# Patient Record
Sex: FEMALE | Race: WHITE | Employment: OTHER | URBAN - METROPOLITAN AREA
[De-identification: names, ages, dates, MRNs, and addresses within clinical notes are randomized per-mention and may not be internally consistent; named-entity substitution may affect disease eponyms.]

---

## 2019-10-23 PROBLEM — G47.00 INSOMNIA: Status: ACTIVE | Noted: 2018-04-18

## 2019-10-23 PROBLEM — Z79.4 TYPE 2 DIABETES MELLITUS WITHOUT COMPLICATION, WITH LONG-TERM CURRENT USE OF INSULIN (HCC): Status: ACTIVE | Noted: 2018-04-18

## 2019-10-23 PROBLEM — E87.6 HYPOKALEMIA: Status: ACTIVE | Noted: 2019-10-23

## 2019-10-23 PROBLEM — D50.9 IRON DEFICIENCY ANEMIA: Status: ACTIVE | Noted: 2019-10-23

## 2019-10-23 PROBLEM — M54.50 LUMBAR PAIN: Status: ACTIVE | Noted: 2019-07-24

## 2019-10-23 PROBLEM — E78.5 HYPERLIPIDEMIA: Status: ACTIVE | Noted: 2019-10-23

## 2019-10-23 PROBLEM — E11.9 TYPE 2 DIABETES MELLITUS WITHOUT COMPLICATION, WITH LONG-TERM CURRENT USE OF INSULIN (HCC): Status: ACTIVE | Noted: 2018-04-18

## 2020-08-06 ENCOUNTER — HOSPITAL ENCOUNTER (OUTPATIENT)
Age: 80
Discharge: HOME | DRG: 390 | End: 2020-08-06
Payer: MEDICARE

## 2020-08-06 ENCOUNTER — APPOINTMENT (OUTPATIENT)
Dept: GENERAL RADIOLOGY | Age: 80
DRG: 390 | End: 2020-08-06
Payer: MEDICARE

## 2020-08-06 ENCOUNTER — HOSPITAL ENCOUNTER (INPATIENT)
Age: 80
LOS: 2 days | Discharge: HOME | DRG: 390 | End: 2020-08-08
Attending: EMERGENCY MEDICINE | Admitting: INTERNAL MEDICINE
Payer: MEDICARE

## 2020-08-06 ENCOUNTER — APPOINTMENT (OUTPATIENT)
Dept: CT IMAGING | Age: 80
DRG: 390 | End: 2020-08-06
Payer: MEDICARE

## 2020-08-06 ENCOUNTER — HOSPITAL ENCOUNTER (OUTPATIENT)
Dept: GENERAL RADIOLOGY | Age: 80
Discharge: HOME | DRG: 390 | End: 2020-08-06
Payer: MEDICARE

## 2020-08-06 PROBLEM — K56.7 ILEUS (HCC): Status: ACTIVE | Noted: 2020-08-06

## 2020-08-06 LAB
A/G RATIO: 1.4 (ref 1.1–2.2)
ALBUMIN SERPL-MCNC: 4.2 G/DL (ref 3.4–5)
ALP BLD-CCNC: 83 U/L (ref 40–129)
ALT SERPL-CCNC: 25 U/L (ref 10–40)
ANION GAP SERPL CALCULATED.3IONS-SCNC: 12 MMOL/L (ref 3–16)
APTT: 28.4 SEC (ref 24.2–36.2)
AST SERPL-CCNC: 40 U/L (ref 15–37)
BASOPHILS ABSOLUTE: 0 K/UL (ref 0–0.2)
BASOPHILS RELATIVE PERCENT: 0.2 %
BILIRUB SERPL-MCNC: 0.4 MG/DL (ref 0–1)
BUN BLDV-MCNC: 19 MG/DL (ref 7–20)
CALCIUM SERPL-MCNC: 10 MG/DL (ref 8.3–10.6)
CHLORIDE BLD-SCNC: 105 MMOL/L (ref 99–110)
CO2: 25 MMOL/L (ref 21–32)
CREAT SERPL-MCNC: 1.5 MG/DL (ref 0.6–1.2)
EOSINOPHILS ABSOLUTE: 0 K/UL (ref 0–0.6)
EOSINOPHILS RELATIVE PERCENT: 0 %
GFR AFRICAN AMERICAN: 41
GFR NON-AFRICAN AMERICAN: 34
GLOBULIN: 3 G/DL
GLUCOSE BLD-MCNC: 124 MG/DL (ref 70–99)
GLUCOSE BLD-MCNC: 144 MG/DL (ref 70–99)
HCT VFR BLD CALC: 35.3 % (ref 36–48)
HEMOGLOBIN: 12 G/DL (ref 12–16)
INR BLD: 0.98 (ref 0.86–1.14)
LIPASE: 16 U/L (ref 13–60)
LYMPHOCYTES ABSOLUTE: 0.4 K/UL (ref 1–5.1)
LYMPHOCYTES RELATIVE PERCENT: 6.7 %
MCH RBC QN AUTO: 30.8 PG (ref 26–34)
MCHC RBC AUTO-ENTMCNC: 34 G/DL (ref 31–36)
MCV RBC AUTO: 90.6 FL (ref 80–100)
MONOCYTES ABSOLUTE: 0.4 K/UL (ref 0–1.3)
MONOCYTES RELATIVE PERCENT: 6.2 %
NEUTROPHILS ABSOLUTE: 5.1 K/UL (ref 1.7–7.7)
NEUTROPHILS RELATIVE PERCENT: 86.9 %
PDW BLD-RTO: 14.4 % (ref 12.4–15.4)
PERFORMED ON: ABNORMAL
PLATELET # BLD: 109 K/UL (ref 135–450)
PMV BLD AUTO: 9 FL (ref 5–10.5)
POTASSIUM SERPL-SCNC: 3.7 MMOL/L (ref 3.5–5.1)
PROTHROMBIN TIME: 11.4 SEC (ref 10–13.2)
RBC # BLD: 3.9 M/UL (ref 4–5.2)
SODIUM BLD-SCNC: 142 MMOL/L (ref 136–145)
TOTAL PROTEIN: 7.2 G/DL (ref 6.4–8.2)
TROPONIN: <0.01 NG/ML
WBC # BLD: 5.9 K/UL (ref 4–11)

## 2020-08-06 PROCEDURE — 94761 N-INVAS EAR/PLS OXIMETRY MLT: CPT

## 2020-08-06 PROCEDURE — 85610 PROTHROMBIN TIME: CPT

## 2020-08-06 PROCEDURE — 74019 RADEX ABDOMEN 2 VIEWS: CPT

## 2020-08-06 PROCEDURE — 1200000000 HC SEMI PRIVATE

## 2020-08-06 PROCEDURE — 74018 RADEX ABDOMEN 1 VIEW: CPT

## 2020-08-06 PROCEDURE — 80053 COMPREHEN METABOLIC PANEL: CPT

## 2020-08-06 PROCEDURE — 85025 COMPLETE CBC W/AUTO DIFF WBC: CPT

## 2020-08-06 PROCEDURE — 85730 THROMBOPLASTIN TIME PARTIAL: CPT

## 2020-08-06 PROCEDURE — 84484 ASSAY OF TROPONIN QUANT: CPT

## 2020-08-06 PROCEDURE — 93005 ELECTROCARDIOGRAM TRACING: CPT | Performed by: EMERGENCY MEDICINE

## 2020-08-06 PROCEDURE — 0D9670Z DRAINAGE OF STOMACH WITH DRAINAGE DEVICE, VIA NATURAL OR ARTIFICIAL OPENING: ICD-10-PCS | Performed by: PHYSICIAN ASSISTANT

## 2020-08-06 PROCEDURE — 99285 EMERGENCY DEPT VISIT HI MDM: CPT

## 2020-08-06 PROCEDURE — 83690 ASSAY OF LIPASE: CPT

## 2020-08-06 PROCEDURE — 74176 CT ABD & PELVIS W/O CONTRAST: CPT

## 2020-08-06 PROCEDURE — 83036 HEMOGLOBIN GLYCOSYLATED A1C: CPT

## 2020-08-06 RX ORDER — SODIUM CHLORIDE 9 MG/ML
INJECTION, SOLUTION INTRAVENOUS CONTINUOUS
Status: DISCONTINUED | OUTPATIENT
Start: 2020-08-06 | End: 2020-08-06

## 2020-08-06 RX ORDER — ONDANSETRON 2 MG/ML
4 INJECTION INTRAMUSCULAR; INTRAVENOUS EVERY 6 HOURS PRN
Status: DISCONTINUED | OUTPATIENT
Start: 2020-08-06 | End: 2020-08-06 | Stop reason: SDUPTHER

## 2020-08-06 RX ORDER — DOXAZOSIN MESYLATE 1 MG/1
2 TABLET ORAL NIGHTLY
Status: DISCONTINUED | OUTPATIENT
Start: 2020-08-06 | End: 2020-08-08 | Stop reason: HOSPADM

## 2020-08-06 RX ORDER — ACETAMINOPHEN 650 MG/1
650 SUPPOSITORY RECTAL EVERY 6 HOURS PRN
Status: DISCONTINUED | OUTPATIENT
Start: 2020-08-06 | End: 2020-08-08 | Stop reason: HOSPADM

## 2020-08-06 RX ORDER — CETIRIZINE HYDROCHLORIDE 10 MG/1
10 TABLET ORAL DAILY
Status: DISCONTINUED | OUTPATIENT
Start: 2020-08-07 | End: 2020-08-08 | Stop reason: HOSPADM

## 2020-08-06 RX ORDER — POLYETHYLENE GLYCOL 3350 17 G/17G
17 POWDER, FOR SOLUTION ORAL DAILY PRN
Status: DISCONTINUED | OUTPATIENT
Start: 2020-08-06 | End: 2020-08-08 | Stop reason: HOSPADM

## 2020-08-06 RX ORDER — DEXTROSE MONOHYDRATE 25 G/50ML
12.5 INJECTION, SOLUTION INTRAVENOUS PRN
Status: DISCONTINUED | OUTPATIENT
Start: 2020-08-06 | End: 2020-08-08 | Stop reason: HOSPADM

## 2020-08-06 RX ORDER — CALCIUM CARBONATE/VITAMIN D3 250-3.125
1 TABLET ORAL 2 TIMES DAILY
Status: DISCONTINUED | OUTPATIENT
Start: 2020-08-06 | End: 2020-08-08 | Stop reason: HOSPADM

## 2020-08-06 RX ORDER — SODIUM CHLORIDE 0.9 % (FLUSH) 0.9 %
10 SYRINGE (ML) INJECTION EVERY 12 HOURS SCHEDULED
Status: DISCONTINUED | OUTPATIENT
Start: 2020-08-06 | End: 2020-08-08 | Stop reason: HOSPADM

## 2020-08-06 RX ORDER — INSULIN LISPRO 100 [IU]/ML
0.05 INJECTION, SOLUTION INTRAVENOUS; SUBCUTANEOUS
Status: DISCONTINUED | OUTPATIENT
Start: 2020-08-07 | End: 2020-08-08 | Stop reason: HOSPADM

## 2020-08-06 RX ORDER — HYDROXYCHLOROQUINE SULFATE 200 MG/1
200 TABLET, FILM COATED ORAL 2 TIMES DAILY
Status: DISCONTINUED | OUTPATIENT
Start: 2020-08-06 | End: 2020-08-08 | Stop reason: HOSPADM

## 2020-08-06 RX ORDER — ATORVASTATIN CALCIUM 80 MG/1
80 TABLET, FILM COATED ORAL NIGHTLY
Status: DISCONTINUED | OUTPATIENT
Start: 2020-08-06 | End: 2020-08-08 | Stop reason: HOSPADM

## 2020-08-06 RX ORDER — MORPHINE SULFATE 2 MG/ML
2 INJECTION, SOLUTION INTRAMUSCULAR; INTRAVENOUS EVERY 4 HOURS PRN
Status: DISCONTINUED | OUTPATIENT
Start: 2020-08-06 | End: 2020-08-06

## 2020-08-06 RX ORDER — INSULIN LISPRO 100 [IU]/ML
0-6 INJECTION, SOLUTION INTRAVENOUS; SUBCUTANEOUS EVERY 4 HOURS
Status: DISCONTINUED | OUTPATIENT
Start: 2020-08-06 | End: 2020-08-08

## 2020-08-06 RX ORDER — PROMETHAZINE HYDROCHLORIDE 25 MG/1
12.5 TABLET ORAL EVERY 6 HOURS PRN
Status: DISCONTINUED | OUTPATIENT
Start: 2020-08-06 | End: 2020-08-08 | Stop reason: HOSPADM

## 2020-08-06 RX ORDER — CYCLOBENZAPRINE HCL 10 MG
10 TABLET ORAL 3 TIMES DAILY PRN
Status: DISCONTINUED | OUTPATIENT
Start: 2020-08-06 | End: 2020-08-08 | Stop reason: HOSPADM

## 2020-08-06 RX ORDER — LEVOTHYROXINE SODIUM 0.1 MG/1
100 TABLET ORAL
Status: DISCONTINUED | OUTPATIENT
Start: 2020-08-07 | End: 2020-08-08 | Stop reason: HOSPADM

## 2020-08-06 RX ORDER — MORPHINE SULFATE 2 MG/ML
2 INJECTION, SOLUTION INTRAMUSCULAR; INTRAVENOUS
Status: DISCONTINUED | OUTPATIENT
Start: 2020-08-06 | End: 2020-08-08 | Stop reason: HOSPADM

## 2020-08-06 RX ORDER — DEXTROSE MONOHYDRATE 50 MG/ML
100 INJECTION, SOLUTION INTRAVENOUS PRN
Status: DISCONTINUED | OUTPATIENT
Start: 2020-08-06 | End: 2020-08-08 | Stop reason: HOSPADM

## 2020-08-06 RX ORDER — MORPHINE SULFATE 4 MG/ML
4 INJECTION, SOLUTION INTRAMUSCULAR; INTRAVENOUS
Status: DISCONTINUED | OUTPATIENT
Start: 2020-08-06 | End: 2020-08-08 | Stop reason: HOSPADM

## 2020-08-06 RX ORDER — SODIUM CHLORIDE 9 MG/ML
INJECTION, SOLUTION INTRAVENOUS CONTINUOUS
Status: DISCONTINUED | OUTPATIENT
Start: 2020-08-06 | End: 2020-08-08 | Stop reason: HOSPADM

## 2020-08-06 RX ORDER — ACETAMINOPHEN 325 MG/1
650 TABLET ORAL EVERY 6 HOURS PRN
Status: DISCONTINUED | OUTPATIENT
Start: 2020-08-06 | End: 2020-08-08 | Stop reason: HOSPADM

## 2020-08-06 RX ORDER — LEVOTHYROXINE SODIUM 0.1 MG/1
100 TABLET ORAL DAILY
Status: DISCONTINUED | OUTPATIENT
Start: 2020-08-07 | End: 2020-08-06 | Stop reason: CLARIF

## 2020-08-06 RX ORDER — ASPIRIN 81 MG/1
81 TABLET ORAL DAILY
Status: DISCONTINUED | OUTPATIENT
Start: 2020-08-07 | End: 2020-08-08 | Stop reason: HOSPADM

## 2020-08-06 RX ORDER — ONDANSETRON 2 MG/ML
4 INJECTION INTRAMUSCULAR; INTRAVENOUS EVERY 6 HOURS PRN
Status: DISCONTINUED | OUTPATIENT
Start: 2020-08-06 | End: 2020-08-08 | Stop reason: HOSPADM

## 2020-08-06 RX ORDER — NICOTINE POLACRILEX 4 MG
15 LOZENGE BUCCAL PRN
Status: DISCONTINUED | OUTPATIENT
Start: 2020-08-06 | End: 2020-08-08 | Stop reason: HOSPADM

## 2020-08-06 RX ORDER — SODIUM CHLORIDE 0.9 % (FLUSH) 0.9 %
10 SYRINGE (ML) INJECTION PRN
Status: DISCONTINUED | OUTPATIENT
Start: 2020-08-06 | End: 2020-08-08 | Stop reason: HOSPADM

## 2020-08-06 ASSESSMENT — ENCOUNTER SYMPTOMS
COUGH: 0
ABDOMINAL DISTENTION: 1
VOMITING: 0
WHEEZING: 0
ABDOMINAL PAIN: 1
RHINORRHEA: 0
SHORTNESS OF BREATH: 1
NAUSEA: 0
DIARRHEA: 0

## 2020-08-06 ASSESSMENT — PAIN SCALES - GENERAL: PAINLEVEL_OUTOF10: 5

## 2020-08-06 NOTE — ED PROVIDER NOTES
[address] [facility]        Pt Name: Mary Leary  MRN: [id number]  [place] 1940  Date of evaluation: 8/6/2020  Provider: Antony Martínez PA-C  PCP: Trinidad Gonzales MD     I have seen and evaluated this patient with my supervising physician No att. providers found. CHIEF COMPLAINT       Chief Complaint   Patient presents with    Abnormal Test Results     Had EGD and colonoscopy with polyps removed and now having increased shortness of breath. HISTORY OF PRESENT ILLNESS   (Location, Timing/Onset, Context/Setting, Quality, Duration, Modifying Factors, Severity, Associated Signs and Symptoms)  Note limiting factors. Mary Leary is a 67 y.o. female presents for evaluation of abdominal distention and shortness of breath status post colonoscopy and EGD performed earlier today. She did have polypectomy. Outpatient abdominal films showed severe distention and pneumatosis concerning for possible perforation versus obstruction. Sent to ED for further evaluation management. Nursing Notes were all reviewed and agreed with or any disagreements were addressed in the HPI. REVIEW OF SYSTEMS    (2-9 systems for level 4, 10 or more for level 5)     Review of Systems   Constitutional: Negative for appetite change, chills and fever. HENT: Negative for congestion and rhinorrhea. Respiratory: Positive for shortness of breath. Negative for cough and wheezing. Cardiovascular: Negative for chest pain. Gastrointestinal: Positive for abdominal distention and abdominal pain. Negative for diarrhea, nausea and vomiting. Genitourinary: Negative for difficulty urinating, dysuria and hematuria. Musculoskeletal: Negative for neck pain and neck stiffness. Skin: Negative for rash. Neurological: Negative for headaches. Positives and Pertinent negatives as per HPI.   Except as noted above in the ROS, all other systems were Voltaren  [diclofenac sodium]; and Elavil  [amitriptyline hcl]    FAMILYHISTORY       Family History   Problem Relation Age of Onset    Heart Disease Father           SOCIAL HISTORY       Social History     Tobacco Use    Smoking status: Never Smoker    Smokeless tobacco: Never Used   Substance Use Topics    Alcohol use: No    Drug use: No       SCREENINGS    [place] Coma Scale  Eye Opening: Spontaneous  Best Verbal Response: Oriented  Best Motor Response: Obeys commands  [place] Coma Scale Score: 15        PHYSICAL EXAM    (up to 7 for level 4, 8 or more for level 5)     ED Triage Vitals [08/06/20 1753]   BP Temp Temp src Pulse Resp SpO2 Height Weight   -- 98.1 °F (36.7 °C) -- 67 18 97 % 5' 2\" (1.575 m) 170 lb (77.1 kg)       Physical Exam  Vitals signs and nursing note reviewed. Constitutional:       General: She is not in acute distress. Appearance: She is well-developed. She is not ill-appearing, toxic-appearing or diaphoretic. HENT:      Head: Normocephalic and atraumatic. Right Ear: External ear normal.      Left Ear: External ear normal.      Nose: Nose normal.   Eyes:      General:         Right eye: No discharge. Left eye: No discharge. Neck:      Musculoskeletal: Normal range of motion and neck supple. Cardiovascular:      Rate and Rhythm: Normal rate and regular rhythm. Heart sounds: Normal heart sounds. Pulmonary:      Effort: Pulmonary effort is normal. No respiratory distress. Breath sounds: Normal breath sounds. Chest:      Chest wall: No tenderness. Abdominal:      General: There is distension. Palpations: Abdomen is soft. There is no mass. Tenderness: There is no abdominal tenderness. There is no guarding or rebound. Hernia: No hernia is present. Musculoskeletal: Normal range of motion. Skin:     General: Skin is warm and dry. Neurological:      Mental Status: She is alert and oriented to person, place, and time.    Psychiatric: Behavior: Behavior normal.         DIAGNOSTIC RESULTS   LABS:    Labs Reviewed   CBC WITH AUTO DIFFERENTIAL - Abnormal; Notable for the following components:       Result Value    RBC 3.90 (*)     Hematocrit 35.3 (*)     Platelets 012 (*)     Lymphocytes Absolute 0.4 (*)     All other components within normal limits    Narrative:     Performed at:  [facility] [address] [place], [place]   Phone [phone]   COMPREHENSIVE METABOLIC PANEL - Abnormal; Notable for the following components:    Glucose 144 (*)     CREATININE 1.5 (*)     GFR Non- 34 (*)     GFR  41 (*)     AST 40 (*)     All other components within normal limits    Narrative:     Performed at:  [facility] [address], [place]   Phone [phone]   LIPASE    Narrative:     Performed at:  [facility] [address] [facility] [facility] [place] [place]   Phone [phone]   TROPONIN    Narrative:     Performed at:  [facility] [address], [place]   Phone [phone]   APTT    Narrative:     Performed at:  [facility] [address], [place]   Phone [phone]   PROTIME-INR    Narrative:     Performed at:  [facility] [address], [place]   Phone [phone]   CBC WITH AUTO DIFFERENTIAL   COMPREHENSIVE METABOLIC PANEL   PROTIME-INR   LACTIC ACID, PLASMA   LIPASE   MAGNESIUM   PHOSPHORUS   PREALBUMIN   APTT   TRANSFERRIN   HEMOGLOBIN A1C   POCT GLUCOSE   POCT GLUCOSE   POCT GLUCOSE   POCT GLUCOSE       All other labs were within normal range or not returned as of this dictation. EKG: All EKG's are interpreted by the Emergency Department Physician in the absence of a cardiologist.  Please see their note for interpretation of EKG.       RADIOLOGY: Non-plain film images such as CT, Ultrasound and MRI are read by the radiologist. Plain radiographic images are visualized and preliminarily interpreted by the ED Provider with the below findings:        Interpretation per the Radiologist below, if available at the time of this note:    CT CHEST [address]   Final Result   No acute intrathoracic abnormality. Dilation of the stomach and small bowel, likely ileus. Xr Abdomen (2 Views)    Result Date: 8/6/2020  EXAMINATION: TWO XRAY VIEWS OF THE ABDOMEN 8/6/2020 4:46 pm COMPARISON: None available HISTORY: ORDERING SYSTEM PROVIDED HISTORY: Acute abdominal pain TECHNOLOGIST PROVIDED HISTORY: Reason for Exam: pain after colonoscopy today Acuity: Unknown Type of Exam: Unknown FINDINGS: Curvature of the lower thoracic and lumbar spine is identified with upper concavity to the left. The lung bases are not well evaluated; no lobar basilar consolidation is suspected. No free intraperitoneal air is identified. There is at least moderate dilation of the stomach; multiple central and left small-bowel loops are also dilated measuring up to 4.1 cm. Gas is scattered throughout the colon. Within the pelvis, bowel pneumatosis is suspected. Marked dilation of the stomach and moderate dilation of small bowel loops. Pneumatosis of bowel within the pelvis is suspected. Bowel dilation could represent ileus or obstruction.            PROCEDURES   Unless otherwise noted below, none     Procedures    CRITICAL CARE TIME   N/A    CONSULTS:  IP CONSULT TO HOSPITALIST  IP CONSULT TO GI  IP CONSULT TO HOSPITALIST      EMERGENCY DEPARTMENT COURSE and DIFFERENTIAL DIAGNOSIS/MDM:   Vitals:    Vitals:    08/06/20 1753 08/06/20 1833   BP:  (!) 137/57   Pulse: 67 77   Resp: 18 21   Temp: 98.1 °F (36.7 °C)    SpO2: 97% 98%   Weight: 170 lb (77.1 kg)    Height: 5' 2\" (1.575 m)        Patient was given the following medications:  Medications   0.9 % sodium chloride infusion (has no administration in time range)   morphine (PF) injection 2 mg (has no administration in time range)     Or   morphine injection 4 mg (has no administration in time range)   ondansetron (ZOFRAN) injection 4 mg (has no administration in time range)   sodium chloride flush 0.9 % injection 10 mL (has no administration in time range)   sodium chloride flush 0.9 % injection 10 mL (has no administration in time range)   acetaminophen (TYLENOL) tablet 650 mg (has no administration in time range)     Or   acetaminophen (TYLENOL) suppository 650 mg (has no administration in time range)   polyethylene glycol (GLYCOLAX) packet 17 g (has no administration in time range)   promethazine (PHENERGAN) tablet 12.5 mg (has no administration in time range)     Or   ondansetron (ZOFRAN) injection 4 mg (has no administration in time range)   enoxaparin (LOVENOX) injection 30 mg (has no administration in time range)   insulin lispro (1 Unit Dial) 4 Units (has no administration in time range)   glucose (GLUTOSE) 40 % oral gel 15 g (has no administration in time range)   dextrose 50 % IV solution (has no administration in time range)   glucagon (rDNA) injection 1 mg (has no administration in time range)   dextrose 5 % solution (has no administration in time range)   insulin lispro (1 Unit Dial) 0-6 Units (has no administration in time range)           Patient presents for evaluation of abdominal pain/distention and shortness of breath status post colonoscopy. On exam, she seems uncomfortable but is in no acute distress and nontoxic. Vitals are stable and she is afebrile, satting 97% on room air. Lungs are clear to auscultation bilaterally. Abdomen is distended but nontender. Not rigid. Please see attending note for EKG interpretation. CBC and CMP are unremarkable. She has a  mild chronic kidney disease with a creatinine of 1.5. Lipase 16. Troponin is negative. Coags are within normal limits.   CT of the chest abdomen pelvis shows no intrathoracic abnormalities. She does have dilation of the stomach and small bowel likely ileus. I spoke with the on-call general surgeon, Dr. Krissy Corral, as well as on-call gastroenterologist, Dr. Carmen Abarca, both agreed for NG tube placement, admission for observation and will consult with the patient in the morning. Hospitals will resume care the patient at this time. Patient was informed and is agreeable. She is stable for admission. FINAL IMPRESSION      1.  [facility])          DISPOSITION/PLAN   DISPOSITION        PATIENT REFERREDTO:  Trinidad Gonzales, [address] [address] [phone]            DISCHARGE MEDICATIONS:  New Prescriptions    No medications on file       DISCONTINUED MEDICATIONS:  Discontinued Medications    No medications on file              (Please note that portions of this note were completed with a voice recognition program.  Efforts were made to edit the dictations but occasionally words are mis-transcribed.)    Antony Martínez PA-C (electronically signed)           Roberto Tapia PA-C  08/06/20 5268

## 2020-08-07 ENCOUNTER — APPOINTMENT (OUTPATIENT)
Dept: GENERAL RADIOLOGY | Age: 80
DRG: 390 | End: 2020-08-07
Payer: MEDICARE

## 2020-08-07 PROBLEM — N18.30 CKD (CHRONIC KIDNEY DISEASE) STAGE 3, GFR 30-59 ML/MIN (HCC): Status: ACTIVE | Noted: 2020-08-07

## 2020-08-07 LAB
A/G RATIO: 1.7 (ref 1.1–2.2)
ALBUMIN SERPL-MCNC: 3.6 G/DL (ref 3.4–5)
ALP BLD-CCNC: 63 U/L (ref 40–129)
ALT SERPL-CCNC: 20 U/L (ref 10–40)
ANION GAP SERPL CALCULATED.3IONS-SCNC: 9 MMOL/L (ref 3–16)
APTT: 22.7 SEC (ref 24.2–36.2)
AST SERPL-CCNC: 30 U/L (ref 15–37)
BASOPHILS ABSOLUTE: 0 K/UL (ref 0–0.2)
BASOPHILS RELATIVE PERCENT: 0.8 %
BILIRUB SERPL-MCNC: 0.3 MG/DL (ref 0–1)
BUN BLDV-MCNC: 16 MG/DL (ref 7–20)
CALCIUM SERPL-MCNC: 9.3 MG/DL (ref 8.3–10.6)
CHLORIDE BLD-SCNC: 107 MMOL/L (ref 99–110)
CO2: 28 MMOL/L (ref 21–32)
CREAT SERPL-MCNC: 1.4 MG/DL (ref 0.6–1.2)
EKG ATRIAL RATE: 72 BPM
EKG DIAGNOSIS: NORMAL
EKG P AXIS: 55 DEGREES
EKG P-R INTERVAL: 172 MS
EKG Q-T INTERVAL: 438 MS
EKG QRS DURATION: 98 MS
EKG QTC CALCULATION (BAZETT): 479 MS
EKG R AXIS: -16 DEGREES
EKG T AXIS: -10 DEGREES
EKG VENTRICULAR RATE: 72 BPM
EOSINOPHILS ABSOLUTE: 0 K/UL (ref 0–0.6)
EOSINOPHILS RELATIVE PERCENT: 0 %
ESTIMATED AVERAGE GLUCOSE: 131.2 MG/DL
GFR AFRICAN AMERICAN: 44
GFR NON-AFRICAN AMERICAN: 36
GLOBULIN: 2.1 G/DL
GLUCOSE BLD-MCNC: 101 MG/DL (ref 70–99)
GLUCOSE BLD-MCNC: 118 MG/DL (ref 70–99)
GLUCOSE BLD-MCNC: 86 MG/DL (ref 70–99)
GLUCOSE BLD-MCNC: 89 MG/DL (ref 70–99)
GLUCOSE BLD-MCNC: 90 MG/DL (ref 70–99)
GLUCOSE BLD-MCNC: 96 MG/DL (ref 70–99)
HBA1C MFR BLD: 6.2 %
HCT VFR BLD CALC: 31.3 % (ref 36–48)
HEMOGLOBIN: 10.4 G/DL (ref 12–16)
INR BLD: 1.05 (ref 0.86–1.14)
LACTIC ACID: 0.7 MMOL/L (ref 0.4–2)
LIPASE: 11 U/L (ref 13–60)
LYMPHOCYTES ABSOLUTE: 1.1 K/UL (ref 1–5.1)
LYMPHOCYTES RELATIVE PERCENT: 22.6 %
MAGNESIUM: 2 MG/DL (ref 1.8–2.4)
MCH RBC QN AUTO: 30.1 PG (ref 26–34)
MCHC RBC AUTO-ENTMCNC: 33.3 G/DL (ref 31–36)
MCV RBC AUTO: 90.3 FL (ref 80–100)
MONOCYTES ABSOLUTE: 0.5 K/UL (ref 0–1.3)
MONOCYTES RELATIVE PERCENT: 10.8 %
NEUTROPHILS ABSOLUTE: 3.1 K/UL (ref 1.7–7.7)
NEUTROPHILS RELATIVE PERCENT: 65.8 %
PDW BLD-RTO: 14.1 % (ref 12.4–15.4)
PERFORMED ON: ABNORMAL
PERFORMED ON: ABNORMAL
PERFORMED ON: NORMAL
PHOSPHORUS: 3.2 MG/DL (ref 2.5–4.9)
PLATELET # BLD: 102 K/UL (ref 135–450)
PMV BLD AUTO: 9.1 FL (ref 5–10.5)
POTASSIUM SERPL-SCNC: 3.3 MMOL/L (ref 3.5–5.1)
PREALBUMIN: 17.4 MG/DL (ref 20–40)
PROTHROMBIN TIME: 12.2 SEC (ref 10–13.2)
RBC # BLD: 3.47 M/UL (ref 4–5.2)
SODIUM BLD-SCNC: 144 MMOL/L (ref 136–145)
TOTAL PROTEIN: 5.7 G/DL (ref 6.4–8.2)
TRANSFERRIN: 186 MG/DL (ref 200–360)
WBC # BLD: 4.8 K/UL (ref 4–11)

## 2020-08-07 PROCEDURE — 2580000003 HC RX 258: Performed by: INTERNAL MEDICINE

## 2020-08-07 PROCEDURE — 84100 ASSAY OF PHOSPHORUS: CPT

## 2020-08-07 PROCEDURE — 83690 ASSAY OF LIPASE: CPT

## 2020-08-07 PROCEDURE — 84134 ASSAY OF PREALBUMIN: CPT

## 2020-08-07 PROCEDURE — 80053 COMPREHEN METABOLIC PANEL: CPT

## 2020-08-07 PROCEDURE — 85025 COMPLETE CBC W/AUTO DIFF WBC: CPT

## 2020-08-07 PROCEDURE — 99222 1ST HOSP IP/OBS MODERATE 55: CPT | Performed by: SURGERY

## 2020-08-07 PROCEDURE — 6360000002 HC RX W HCPCS: Performed by: INTERNAL MEDICINE

## 2020-08-07 PROCEDURE — 1200000000 HC SEMI PRIVATE

## 2020-08-07 PROCEDURE — 6370000000 HC RX 637 (ALT 250 FOR IP): Performed by: INTERNAL MEDICINE

## 2020-08-07 PROCEDURE — 85610 PROTHROMBIN TIME: CPT

## 2020-08-07 PROCEDURE — 94760 N-INVAS EAR/PLS OXIMETRY 1: CPT

## 2020-08-07 PROCEDURE — APPSS60 APP SPLIT SHARED TIME 46-60 MINUTES: Performed by: NURSE PRACTITIONER

## 2020-08-07 PROCEDURE — 93010 ELECTROCARDIOGRAM REPORT: CPT | Performed by: INTERNAL MEDICINE

## 2020-08-07 PROCEDURE — APPNB30 APP NON BILLABLE TIME 0-30 MINS: Performed by: NURSE PRACTITIONER

## 2020-08-07 PROCEDURE — 92526 ORAL FUNCTION THERAPY: CPT

## 2020-08-07 PROCEDURE — 83735 ASSAY OF MAGNESIUM: CPT

## 2020-08-07 PROCEDURE — 92610 EVALUATE SWALLOWING FUNCTION: CPT

## 2020-08-07 PROCEDURE — 94761 N-INVAS EAR/PLS OXIMETRY MLT: CPT

## 2020-08-07 PROCEDURE — 74018 RADEX ABDOMEN 1 VIEW: CPT

## 2020-08-07 PROCEDURE — 84466 ASSAY OF TRANSFERRIN: CPT

## 2020-08-07 PROCEDURE — 2580000003 HC RX 258: Performed by: SURGERY

## 2020-08-07 PROCEDURE — 85730 THROMBOPLASTIN TIME PARTIAL: CPT

## 2020-08-07 PROCEDURE — 36415 COLL VENOUS BLD VENIPUNCTURE: CPT

## 2020-08-07 PROCEDURE — 83605 ASSAY OF LACTIC ACID: CPT

## 2020-08-07 RX ORDER — POTASSIUM CHLORIDE 20 MEQ/1
40 TABLET, EXTENDED RELEASE ORAL ONCE
Status: COMPLETED | OUTPATIENT
Start: 2020-08-07 | End: 2020-08-07

## 2020-08-07 RX ADMIN — ASPIRIN 81 MG: 81 TABLET, COATED ORAL at 09:38

## 2020-08-07 RX ADMIN — ENOXAPARIN SODIUM 30 MG: 30 INJECTION SUBCUTANEOUS at 00:41

## 2020-08-07 RX ADMIN — CETIRIZINE HYDROCHLORIDE 10 MG: 10 TABLET, FILM COATED ORAL at 09:38

## 2020-08-07 RX ADMIN — POTASSIUM CHLORIDE 40 MEQ: 1500 TABLET, EXTENDED RELEASE ORAL at 09:37

## 2020-08-07 RX ADMIN — SODIUM CHLORIDE: 9 INJECTION, SOLUTION INTRAVENOUS at 20:46

## 2020-08-07 RX ADMIN — CALCIUM CARBONATE-CHOLECALCIFEROL TAB 250 MG-125 UNIT 250 MG: 250-125 TAB at 09:41

## 2020-08-07 RX ADMIN — CALCIUM CARBONATE-CHOLECALCIFEROL TAB 250 MG-125 UNIT 250 MG: 250-125 TAB at 00:42

## 2020-08-07 RX ADMIN — ENOXAPARIN SODIUM 30 MG: 30 INJECTION SUBCUTANEOUS at 08:02

## 2020-08-07 RX ADMIN — DOXAZOSIN 2 MG: 1 TABLET ORAL at 00:41

## 2020-08-07 RX ADMIN — ATORVASTATIN CALCIUM 80 MG: 80 TABLET, FILM COATED ORAL at 20:46

## 2020-08-07 RX ADMIN — DOXAZOSIN 2 MG: 1 TABLET ORAL at 20:46

## 2020-08-07 RX ADMIN — ATORVASTATIN CALCIUM 80 MG: 80 TABLET, FILM COATED ORAL at 00:42

## 2020-08-07 RX ADMIN — CALCIUM CARBONATE-CHOLECALCIFEROL TAB 250 MG-125 UNIT 250 MG: 250-125 TAB at 20:46

## 2020-08-07 RX ADMIN — METOPROLOL TARTRATE 12.5 MG: 25 TABLET, FILM COATED ORAL at 00:41

## 2020-08-07 RX ADMIN — Medication 10 ML: at 08:02

## 2020-08-07 RX ADMIN — HYDROXYCHLOROQUINE SULFATE 200 MG: 200 TABLET ORAL at 09:38

## 2020-08-07 RX ADMIN — Medication 10 ML: at 21:07

## 2020-08-07 RX ADMIN — SODIUM CHLORIDE: 9 INJECTION, SOLUTION INTRAVENOUS at 10:44

## 2020-08-07 RX ADMIN — HYDROXYCHLOROQUINE SULFATE 200 MG: 200 TABLET ORAL at 20:46

## 2020-08-07 RX ADMIN — INSULIN GLARGINE 10 UNITS: 100 INJECTION, SOLUTION SUBCUTANEOUS at 20:52

## 2020-08-07 RX ADMIN — METOPROLOL TARTRATE 12.5 MG: 25 TABLET, FILM COATED ORAL at 20:46

## 2020-08-07 RX ADMIN — HYDROXYCHLOROQUINE SULFATE 200 MG: 200 TABLET ORAL at 00:41

## 2020-08-07 RX ADMIN — METOPROLOL TARTRATE 12.5 MG: 25 TABLET, FILM COATED ORAL at 09:38

## 2020-08-07 RX ADMIN — Medication 10 ML: at 00:42

## 2020-08-07 RX ADMIN — SODIUM CHLORIDE: 9 INJECTION, SOLUTION INTRAVENOUS at 00:40

## 2020-08-07 ASSESSMENT — PAIN SCALES - GENERAL
PAINLEVEL_OUTOF10: 0

## 2020-08-07 NOTE — PROGRESS NOTES
.4 Eyes Skin Assessment     The patient is being assess for  Admission    I agree that 2 RN's have performed a thorough Head to Toe Skin Assessment on the patient. ALL assessment sites listed below have been assessed. Areas assessed by both nurses: Head to To  [x]   Head, Face, and Ears   [x]   Shoulders, Back, and Chest  [x]   Arms, Elbows, and Hands   [x]   Coccyx, Sacrum, and IschIum  [x]   Legs, Feet, and Heels        Does the Patient have Skin Breakdown?   No         Gurvinder Prevention initiated:  No   Wound Care Orders initiated:  No      [facility] nurse consulted for Pressure Injury (Stage 3,4, Unstageable, DTI, NWPT, and Complex wounds), New and Established Ostomies:  No      Nurse 1 eSignature: Electronically signed by Dev Matute RN on 8/7/20 at 6:02 AM EDT    **SHARE this note so that the co-signing nurse is able to place an eSignature**    Nurse 2 eSignature: Electronically signed by Alan Alvarez RN on 8/7/20 at 6:03 AM EDT

## 2020-08-07 NOTE — PROGRESS NOTES
Pt's HR 47, previously it was in the 50's. Pt asymptomatic, reports she has a hx of bradycardia, this was brought to Dr. Winnie Closs attention.

## 2020-08-07 NOTE — CARE COORDINATION
Discharge Planning Assessment    SW discharge planner met with patient to discuss reason for admission, current living situation, and potential needs at the time of discharge. Demographics/Insurance verified:  Yes    Current type of dwelling:  House    Living arrangements:  Alone    Level of function/Support:  Pt reports she is independent with ADLs    PCP:  Dr. Mirna Hayden    Last Visit to PCP:  June 2020    DME:  amy Burnett    Active with any community resources/agencies/skilled home care:  None. No non-skilled needs reported. Medication compliance issues:  No    Financial issues that could impact healthcare:  No    Tentative discharge plan:  Home most likely. No needs identified at this time. Discussed with patient and/or family that on the day of discharge home tentative time of discharge will be between 10 AM and noon. Transportation at the time of discharge:  Dtr can assist getting home.       Electronically signed by ECHO Marina, STORM on 8/7/2020 at 4:32 PM

## 2020-08-07 NOTE — H&P
admitted for further evaluation. Past Medical History:      Diagnosis Date    Chronic back pain     Hyperlipidemia     Hypertension     Hypothyroidism     Sleep apnea     W/CPAP    Type II or unspecified type diabetes mellitus without mention of complication, not stated as uncontrolled     Unspecified sleep apnea        Past Surgical History:      Procedure Laterality Date    BACK SURGERY      LUMBAR EDUARDO AND CERVICAL SURGERY    BREAST SURGERY      REDUCTION ,BREAST BIOPSYX3    HYSTERECTOMY      JOINT REPLACEMENT      DAVID KNEE       Medications (prior to admission):  Prior to Admission medications    Medication Sig Start Date End Date Taking? Authorizing Provider   potassium chloride (KLOR-CON M) 20 MEQ extended release tablet Take 1 tablet by mouth daily 6/24/20  Yes Lila Cedeño MD   doxazosin (CARDURA) 2 MG tablet Take 1 tablet by mouth nightly 6/1/20  Yes Lila Cedeño MD   torsemide (DEMADEX) 20 MG tablet TAKE ONE TABLET BY MOUTH DAILY 1/6/20  Yes Lila Cedeño MD   insulin glargine (LANTUS) 100 UNIT/ML injection pen Inject 10 Units into the skin daily 8/15/19  Yes Historical Provider, MD   calcium-cholecalciferol (CALCIUM 500+D) 500-200 MG-UNIT per tablet Take 1 tablet by mouth daily   Yes Historical Provider, MD   Calcium Carb-Cholecalciferol (CALCIUM 500+D3) 500-400 MG-UNIT TABS Take 1 tablet by mouth 2 times daily   Yes Historical Provider, MD   pioglitazone (ACTOS) 15 MG tablet Take 15 mg by mouth daily   Yes Historical Provider, MD   cyclobenzaprine (FLEXERIL) 10 MG tablet Take 10 mg by mouth 3 times daily as needed for Muscle spasms   Yes Historical Provider, MD   linagliptin (TRADJENTA) 5 MG tablet Take 5 mg by mouth daily   Yes Historical Provider, MD   cetirizine (ZYRTEC) 10 MG tablet Take 10 mg by mouth daily   Yes Historical Provider, MD   hydroxychloroquine (PLAQUENIL) 200 MG tablet Take  by mouth 2 times daily.    Yes Historical Provider, MD   metoprolol (LOPRESSOR) 25 MG tablet TAKE ONE-HALF TABLET BY MOUTH TWICE A DAY 6/1/14  Yes Ruthie Tolentino MD   atorvastatin (LIPITOR) 80 MG tablet Take 80 mg by mouth daily. Yes Historical Provider, MD   levothyroxine (SYNTHROID) 100 MCG tablet Take 100 mcg by mouth daily. Yes Historical Provider, MD   aspirin 81 MG EC tablet Take 81 mg by mouth daily. Yes Historical Provider, MD       Allergy(ies):  Diclofenac sodium; Elavil [amitriptyline hcl]; Sulfa antibiotics; Voltaren  [diclofenac sodium]; and Elavil  [amitriptyline hcl]    Social History:  TOBACCO:  reports that she has never smoked. She has never used smokeless tobacco.  ETOH:  reports no history of alcohol use. Family History:      Problem Relation Age of Onset    Heart Disease Father        Review of Systems:  Pertinent positives are listed in HPI. At least 10-point ROS reviewed and were negative. Vitals and physical examination:  BP (!) 137/57   Pulse 77   Temp 98.1 °F (36.7 °C)   Resp 21   Ht 5' 2\" (1.575 m)   Wt 170 lb (77.1 kg)   SpO2 98%   BMI 31.09 kg/m²   Gen/overall appearance: Not in acute distress. Alert. Oriented. Head: Normocephalic, atraumatic  Eyes: EOMI, good acuity  ENT: Oral mucosa moist  Neck: No JVD, thyromegaly  CVS: Nml S1S2, no MRG, RRR  Pulm: Clear bilaterally. No crackles/wheezes  Gastrointestinal: Abdomen distended, slightly tender to palpation. No rebound or guarding. Musculoskeletal: No edema. Warm  Neuro: No focal deficit. Moves extremity spontaneously. Psychiatry: Appropriate affect. Not agitated. Skin: Warm, dry with normal turgor.  No rash  Capillary refill: Brisk,< 3 seconds   Peripheral Pulses: +2 palpable, equal bilaterally       Labs/imaging/EKG:  CBC:   Recent Labs     08/06/20  1842   WBC 5.9   HGB 12.0   *     BMP:    Recent Labs     08/06/20  1843      K 3.7      CO2 25   BUN 19   CREATININE 1.5*   GLUCOSE 144*     Hepatic:   Recent Labs     08/06/20  1843   AST 40*   ALT 25   BILITOT 0.4

## 2020-08-07 NOTE — PROGRESS NOTES
NGT removed from pt at this time, pt tolerated well. Pt now on carb control diet. Pt made aware to let nursing staff know should she become nauseous, vomit or bloating. Will continue to monitor.

## 2020-08-07 NOTE — PROGRESS NOTES
Potassium of 3.3 this A.M, Dr Rebecca Rodriguez notified, ordered PO 40MEQ  Potassium Chloride. Dr hilario to give with Pt's NPO status. Pt wants to wait till later in the morning to take pill.

## 2020-08-07 NOTE — PROGRESS NOTES
08/07/20 1925   Oxygen Therapy/Pulse Ox   O2 Therapy Room air   O2 Device None (Room air)  (pt. did not bring home PAP-refused hospital PAP unit)   Resp 15   SpO2 96 %   Pulse Oximeter Device Mode Intermittent   Pulse Oximeter Device Location Finger

## 2020-08-07 NOTE — PROGRESS NOTES
Pt's BG 96, has 4 units prandial + SS ordered, Dr. Rad Cruz made aware, will hold both doses, will continue to monitor.

## 2020-08-07 NOTE — CONSULTS
[facility]     Consult                                                     Patient Name: Melissa Duran  MRN: [id number]  YOB: 1940  Admission date: 8/6/2020  5:47 PM   Date of evaluation: 8/7/2020  Primary Care Physician: Virginie Kaur MD  Requesting provider: Eneida Newberry PA-C  Reason for consult: Abdominal pain  History of Present Illness:    Ms. Paulo Cortez is a 67 y.o. female who presents with pressure stretching pain beginning yesterday after EGD and colonoscopy. Indication for procedure was anemia and EGD negative, colonoscopy with polypectomy. Associated with bloating, nausea but no vomiting. Constant, progressively worse and never happened before. The patient denies fevers, chills, chest pain, dyspnea, cough, jaundice, change in appetite, change in weight, change in bowel movements, dysuria or other complaints otherwise. Medical history includes chronic renal failure and surgical history includes hysterectomy. Initial abdominal X-ray concerning for ileus and pneumatosis and in ED CT showed ileus without pneumatosis, pneumoperitoneum or signs of ischemia.  NG placed, admitted and feels a little better this morning    Past Medical History:        Diagnosis Date    Chronic back pain     Hyperlipidemia     Hypertension     Hypothyroidism     Sleep apnea     W/CPAP    Type II or unspecified type diabetes mellitus without mention of complication, not stated as uncontrolled     Unspecified sleep apnea        Past Surgical History:        Procedure Laterality Date    BACK SURGERY      LUMBAR EDUARDO AND CERVICAL SURGERY    BREAST SURGERY      REDUCTION ,BREAST BIOPSYX3    HYSTERECTOMY      JOINT REPLACEMENT      DAVID KNEE       Scheduled Meds:   aspirin  81 mg Oral Daily    atorvastatin  80 mg Oral Nightly    oyster shell calcium/vitamin D  1 tablet Oral BID    cetirizine  10 mg Oral Daily    doxazosin  2 mg Oral Nightly    hydroxychloroquine 200 mg Oral BID    insulin glargine  10 Units Subcutaneous Nightly    metoprolol tartrate  12.5 mg Oral BID    sodium chloride flush  10 mL Intravenous 2 times per day    enoxaparin  30 mg Subcutaneous Daily    insulin lispro  0.05 Units/kg Subcutaneous TID WC    insulin lispro  0-6 Units Subcutaneous Q4H    levothyroxine  100 mcg Oral QAM AC     Continuous Infusions:   sodium chloride 125 mL/hr at 08/07/20 1044    dextrose       PRN Meds:.morphine **OR** morphine, ondansetron, cyclobenzaprine, sodium chloride flush, acetaminophen **OR** acetaminophen, polyethylene glycol, promethazine **OR** [DISCONTINUED] ondansetron, glucose, dextrose, glucagon (rDNA), dextrose    Prior to Admission medications    Medication Sig Start Date End Date Taking? Authorizing Provider   potassium chloride (KLOR-CON M) 20 MEQ extended release tablet Take 1 tablet by mouth daily 6/24/20  Yes Ramos Murry MD   doxazosin (CARDURA) 2 MG tablet Take 1 tablet by mouth nightly 6/1/20  Yes Ramos Murry MD   torsemide (DEMADEX) 20 MG tablet TAKE ONE TABLET BY MOUTH DAILY 1/6/20  Yes Ramos Murry MD   calcium-cholecalciferol (CALCIUM 500+D) 500-200 MG-UNIT per tablet Take 1 tablet by mouth daily   Yes Historical Provider, MD   Calcium Carb-Cholecalciferol (CALCIUM 500+D3) 500-400 MG-UNIT TABS Take 1 tablet by mouth 2 times daily   Yes Historical Provider, MD   pioglitazone (ACTOS) 15 MG tablet Take 15 mg by mouth daily   Yes Historical Provider, MD   cyclobenzaprine (FLEXERIL) 10 MG tablet Take 10 mg by mouth 3 times daily as needed for Muscle spasms   Yes Historical Provider, MD   linagliptin (TRADJENTA) 5 MG tablet Take 5 mg by mouth daily   Yes Historical Provider, MD   hydroxychloroquine (PLAQUENIL) 200 MG tablet Take  by mouth 2 times daily.    Yes Historical Provider, MD   metoprolol (LOPRESSOR) 25 MG tablet TAKE ONE-HALF TABLET BY MOUTH TWICE A DAY 6/1/14  Yes Shade Antis, MD   atorvastatin (LIPITOR) 80 MG  Not on file       Family History:    Family History   Problem Relation Age of Onset    Heart Disease Father        Review of Systems:  CONSTITUTIONAL:  Negative except as above  HEENT:  negative  RESPIRATORY:  negative  CARDIOVASCULAR:  negative  GASTROINTESTINAL:  negative except as above   GENITOURINARY:  negative  HEMATOLOGIC/LYMPHATIC:  negative  NEUROLOGICAL:  Negative      Vital Signs:  Patient Vitals for the past 24 hrs:   BP Temp Temp src Pulse Resp SpO2 Height Weight   20 1123 134/74 100 °F (37.8 °C) Oral 53 14 93 % -- --   20 0747 137/88 98.9 °F (37.2 °C) Oral 62 15 94 % -- --   20 0354 (!) 104/53 98.9 °F (37.2 °C) Oral 63 16 96 % -- 170 lb 6.4 oz (77.3 kg)   20 0334 -- -- -- -- -- 96 % -- --   20 2335 -- -- -- -- -- 95 % -- --   20 2309 (!) 169/66 -- -- 81 -- -- -- --   20 2247 (!) 169/66 98 °F (36.7 °C) Oral 81 18 96 % -- 169 lb 8 oz (76.9 kg)   20 2200 (!) 138/43 -- -- 82 16 92 % -- --   20 2130 (!) 141/76 -- -- 86 14 96 % -- --   20 2100 (!) 125/106 -- -- 98 14 96 % -- --   20 (!) 139/52 -- -- 77 14 98 % -- --   20 (!) 137/52 -- -- 74 15 96 % -- --   20 1930 (!) 133/109 -- -- 75 17 96 % -- --   20 1900 (!) 146/53 -- -- 77 16 97 % -- --   20 1833 (!) 137/57 -- -- 77 21 98 % -- --   20 1753 -- 98.1 °F (36.7 °C) -- 67 18 97 % 5' 2\" (1.575 m) 170 lb (77.1 kg)      TEMPERATURE HISTORY 24H: Temp (24hrs), Av.8 °F (37.1 °C), Min:98 °F (36.7 °C), Max:100 °F (37.8 °C)    BLOOD PRESSURE HISTORY: Systolic (10WFP), UWO:268 , Min:104 , KJP:367    Diastolic (00INC), MSR:97, Min:43, Max:109    Admission Weight: 170 lb (77.1 kg)       Intake/Output Summary (Last 24 hours) at 2020 1553  Last data filed at 2020 1425  Gross per 24 hour   Intake 1489.7 ml   Output 825 ml   Net 664.7 ml     Drain/tube Output:         Physical Exam:  CONSTITUTIONAL:  alert, no apparent distress   NEUROLOGIC:  Mental Status Exam:  Level of Alertness:   awake  Orientation:  Oriented to person, place, time  EYES:  sclera clear  HENT:  normocepalic, without obvious abnormality  NECK:  supple, symmetrical, trachea midline  LUNGS:  clear to auscultation  CARDIOVASCULAR:  regular rate and rhythm   ABDOMEN: soft, mild distension, mild mid abdominal tenderness without peritonitis  SKIN:  no bruising or bleeding, normal skin color, texture, turgor and no redness, warmth, or swelling      Labs:    CBC:    Recent Labs     08/06/20 1842 08/07/20  0444   WBC 5.9 4.8   HGB 12.0 10.4*   HCT 35.3* 31.3*   * 102*     BMP:    Recent Labs     08/06/20 1843 08/07/20  0444    144   K 3.7 3.3*    107   CO2 25 28   BUN 19 16   CREATININE 1.5* 1.4*   GLUCOSE 144* 89     Hepatic:   Recent Labs     08/06/20 1843 08/07/20  0444   AST 40* 30   ALT 25 20   BILITOT 0.4 0.3   ALKPHOS 83 63     Amylase: No results for input(s): AMYLASE in the last 72 hours. Lipase:   Recent Labs     08/06/20 1843 08/07/20  0444   LIPASE 16.0 11.0*     Mag:      Recent Labs     08/07/20  0444   MG 2.00      Phos:     Recent Labs     08/07/20  0444   PHOS 3.2      Coags:   Recent Labs     08/06/20 1843 08/07/20  0444   INR 0.98 1.05   APTT 28.4 22.7*       Imaging:  I have personally reviewed the following films:    Xr Abdomen (kub) (single Ap View)    Result Date: 8/7/2020  EXAMINATION: ONE SUPINE XRAY VIEW(S) OF THE ABDOMEN 8/7/2020 1:11 pm COMPARISON: Abdominal radiograph August 6, 2020 and priors. HISTORY: ORDERING SYSTEM PROVIDED HISTORY: abdominal distention after EGD and colonoscopy. compare to CT TECHNOLOGIST PROVIDED HISTORY: Reason for exam:->abdominal distention after EGD and colonoscopy. compare to CT Reason for Exam: abdominal distention after EGD and colonoscopy.  compare to CT Acuity: Acute Type of Exam: Initial FINDINGS: Nasogastric tube side hole is well beyond the GE junction with tip overlying the expected location of the distal stomach/pylorus. Gastric distention is decreased compared to the CT. Moderate to large amount of gas is seen within the colon. Gas-filled loop of bowel in the right mid abdomen measures up to 10 cm, possibly representing sigmoid. No dilated gas-filled loops of small bowel. No obvious free air on limited supine imaging. Soft tissues and osseous structures are without acute process. Dilated gas-filled loop of bowel in the mid abdomen likely corresponds to sigmoid and measures up to 10 cm. Moderate amount of gas is seen throughout the remainder of the colon consistent with recent colonoscopy. No obvious free air or acute process on supine imaging. Xr Abdomen For Ng/og/ne Tube Placement    Result Date: 8/6/2020  EXAMINATION: ONE SUPINE XRAY VIEW(S) OF THE ABDOMEN 8/6/2020 9:43 pm COMPARISON: Abdominal radiograph 08/06/2020. CT chest, abdomen, and pelvis 08/06/2020. HISTORY: ORDERING SYSTEM PROVIDED HISTORY: Confirmation of course of NG/OG/NE tube and location of tip of tube TECHNOLOGIST PROVIDED HISTORY: Reason for exam:->Confirmation of course of NG/OG/NE tube and location of tip of tube Portable? ->Yes Reason for Exam: ng placement Acuity: Unknown Type of Exam: Unknown FINDINGS: The tip and side port of the enteric tube are in the gastric body. The lung bases are clear. Nonspecific bowel gas pattern. No acute osseous abnormality. Tip and side port of the enteric tube in the gastric body. Ct Chest Abdomen Pelvis Wo Contrast    Result Date: 8/6/2020  EXAMINATION: CT OF THE CHEST, ABDOMEN, AND PELVIS WITHOUT CONTRAST 8/6/2020 6:36 pm TECHNIQUE: CT of the chest, abdomen and pelvis was performed without the administration of intravenous contrast. Multiplanar reformatted images are provided for review. Dose modulation, iterative reconstruction, and/or weight based adjustment of the mA/kV was utilized to reduce the radiation dose to as low as reasonably achievable.  COMPARISON: Prior studies including abdominal radiographs today, CT July 2005 HISTORY: ORDERING SYSTEM PROVIDED HISTORY: s/p colonoscopy, egd, sob TECHNOLOGIST PROVIDED HISTORY: Reason for exam:->s/p colonoscopy, egd, sob Additional Contrast?->None Reason for Exam: s/p colonoscopy, egd, sob Acuity: Unknown Type of Exam: Subsequent/Follow-up FINDINGS: Chest: Mediastinum: The heart is enlarged scratch the there is four-chamber cardiac enlargement. Coronary arterial and aortic calcifications are identified. The central pulmonary arteries and visualized aorta are within normal limits in caliber and course. Trace pericardial fluid. Lungs/pleura: The central airways are patent. The lungs and pleural spaces are clear with exception of minimal dependent lower lobe airspace disease, likely atelectasis. Soft Tissues/Bones: No acute or focal bony abnormality. Abdomen/Pelvis: Organs: Noncontrast images of the liver, spleen, pancreas, adrenal glands, gallbladder and kidneys show no acute abnormality. GI/Bowel: There is mild dilation of small bowel loops throughout the abdomen, particularly in the left upper quadrant, measuring up to 3 cm; some feculent material is present within the dilated small bowel loops suggesting stasis. No sharp transition of caliber is evident. There is also at least moderate dilation of the stomach. A small paraesophageal hernia is identified. No bowel wall thickening or pneumatosis is seen. There are multiple distal colonic diverticula without evidence of active inflammation. Pelvis: The urinary bladder is nondistended. The uterus is not visualized. Small right inguinal hernia containing only fat. Peritoneum/Retroperitoneum: The aorta is normal in caliber and course, showing calcifications. Bones/Soft Tissues: No acute bony abnormality. No free or focal fluid collection. No extraluminal gas or pathologically enlarged abdominopelvic lymph nodes. No acute intrathoracic abnormality.  Dilation of the stomach and small bowel, likely ileus. Xr Abdomen (2 Views)    Result Date: 8/6/2020  EXAMINATION: TWO XRAY VIEWS OF THE ABDOMEN 8/6/2020 4:46 pm COMPARISON: None available HISTORY: ORDERING SYSTEM PROVIDED HISTORY: Acute abdominal pain TECHNOLOGIST PROVIDED HISTORY: Reason for Exam: pain after colonoscopy today Acuity: Unknown Type of Exam: Unknown FINDINGS: Curvature of the lower thoracic and lumbar spine is identified with upper concavity to the left. The lung bases are not well evaluated; no lobar basilar consolidation is suspected. No free intraperitoneal air is identified. There is at least moderate dilation of the stomach; multiple central and left small-bowel loops are also dilated measuring up to 4.1 cm. Gas is scattered throughout the colon. Within the pelvis, bowel pneumatosis is suspected. Marked dilation of the stomach and moderate dilation of small bowel loops. Pneumatosis of bowel within the pelvis is suspected. Bowel dilation could represent ileus or obstruction. Cultures:  Relevant cultures documented under results section     Assessment:  Patient Active Problem List   Diagnosis    Hypertension    Dyspnea    Weakness of both lower limbs    Diabetes mellitus ([address]    Hypothyroidism    Obstructive sleep apnea    Atrial fibrillation (HCC)    Bilateral fibrocystic breast disease (FCBD)    Esophageal reflux    Hyperlipidemia    Hypokalemia    Insomnia    Iron deficiency anemia    Lumbar pain    Rheumatoid arthritis (HCC)    Type 2 diabetes mellitus without complication, with long-term current use of insulin (HCC)    Varicose veins of lower extremities with inflammation    Ileus ([address]     Ileus after EGD/colonoscopy    Plan:  1. Abdominal exam benign, no signs of ischemia clinically, labs, or imaging. No indication for surgical exploration unless condition deteriorates  2. Bowel rest, NG decompression  3. IVF  4. Monitor and replace electrolytes  5.  Pain medication and antiemetics as needed with caution as may mask worsening exam  6. Defer management of remaining medical comorbidities to primary and consulting teams  7. As ileus is likely related to procedure and not adhesions per se, anticipate this will be self limited and resolve with conservative management. Defer management of ileus to GI and will follow peripherally. Please call with questions and follow with general surgery as needed after discharge    This plan was discussed at length with the patient. She was understanding and in agreement with the plan  Thank you for the consult and allowing me to participate in the care of this patient. I look forward to following her this admission    Jhony Lal MD, FACS  8/7/2020  3:53 PM

## 2020-08-07 NOTE — ED PROVIDER NOTES
This patient was seen by the Mid-Level Provider. I have seen and examined the patient, agree with the workup, evaluation, management and diagnosis. Care plan has been discussed. My assessment reveals a 67-year-old female who presents with abdominal pain. This is a 67-year-old female who is just postop from a EGD and colonoscopy with polypectomy who presents with some abdominal pain and some air in her abdomen by plain film. The patient was sent here by GI. Radiology results:    CT CHEST ABDOMEN PELVIS WO CONTRAST   Final Result   No acute intrathoracic abnormality. Dilation of the stomach and small bowel, likely ileus.          XR ABDOMEN FOR NG/OG/NE TUBE PLACEMENT    (Results Pending)         LABS:    Labs Reviewed   CBC WITH AUTO DIFFERENTIAL - Abnormal; Notable for the following components:       Result Value    RBC 3.90 (*)     Hematocrit 35.3 (*)     Platelets 845 (*)     Lymphocytes Absolute 0.4 (*)     All other components within normal limits    Narrative:     Performed at:  [facility] [address]  [place], [place]   Phone [phone]   COMPREHENSIVE METABOLIC PANEL - Abnormal; Notable for the following components:    Glucose 144 (*)     CREATININE 1.5 (*)     GFR Non- 34 (*)     GFR  41 (*)     AST 40 (*)     All other components within normal limits    Narrative:     Performed at:  [facility] [address], [place]   Phone [phone]   LIPASE    Narrative:     Performed at:  [facility] [address],  [place], [place]   Phone [phone]   TROPONIN    Narrative:     Performed at:  [facility] [address] [place], [place]   Phone [phone]   APTT    Narrative:     Performed at:  [facility] [address] [employer], [place]   Phone [phone] [address] [place] [address]    Narrative:     Performed at:  [facility]  [address] [facility], [address]   Phone [phone]   CBC WITH AUTO DIFFERENTIAL   COMPREHENSIVE METABOLIC PANEL   PROTIME-INR   LACTIC ACID, PLASMA   LIPASE   MAGNESIUM   PHOSPHORUS   PREALBUMIN   APTT   TRANSFERRIN   HEMOGLOBIN A1C   POCT GLUCOSE   POCT GLUCOSE   POCT GLUCOSE   POCT GLUCOSE           EKG:    Sinus rhythm at a rate of 72 beats a minute with no acute ST elevations or depressions or pathologic Q waves. Exam:    Well-nourished female in no acute distress. Abdomen is soft. She had positive bowel sounds. Medical decision makin-year-old female presents with some abdominal distention and pain after a colonoscopy. A CT was obtained of the patient's abdomen that shows what appears to be an ileus with no free air or pneumatosis noted. An NG tube will be placed in the patient. She will be admitted for further care. She is in stable condition. FINAL IMPRESSION:    1.  Ileus [facility])            Tosin Diana MD  [phone]

## 2020-08-07 NOTE — PROGRESS NOTES
Patient does not have home cpap here, uses Apap at home with nasal pillows. Currently she has ng tube. Not a good candidate for hospital V60 as per discussion with patient and daughter. Put on continuous pulse ox and will continue to monitor. Daughter will bring cpap tomorrow if patient longer than one night stay.

## 2020-08-07 NOTE — CONSULTS
Gastroenterology Consult Note      Patient: Herrera Arriaza  : 1940  Acct#:      Date:  2020    Subjective:       History of Present Illness  Patient is a 67 y.o.  female admitted with Ileus ([address] [K56.7]  Ileus ([address] [K56.7] who is seen in consult for the same. Pt had an egd/colon  for iron def anemia. egd normal, colon tortous with diverticulosis and small polyp removed. postop she passed signif air but was still bloated so she had AXR that showed possible pneumatosis but no free air. She was still distended/some discomfort/some sob so she was sent to the ED. CT in the ED did not show any pneumatosis but did show dilation of the stomach and small bowel. She states she was not passing flatus at home yesterday or here. No BM after colonoscopy. No abdominal pain. No N/V.   NG tube in place with 750 ml output since yesterday. Past Medical History:   Diagnosis Date    Chronic back pain     Hyperlipidemia     Hypertension     Hypothyroidism     Sleep apnea     W/CPAP    Type II or unspecified type diabetes mellitus without mention of complication, not stated as uncontrolled     Unspecified sleep apnea       Past Surgical History:   Procedure Laterality Date    BACK SURGERY      LUMBAR EDUARDO AND CERVICAL SURGERY    BREAST SURGERY      REDUCTION ,BREAST BIOPSYX3    HYSTERECTOMY      JOINT REPLACEMENT      DAVID KNEE      Past Endoscopic History: see hpi    Admission Meds  No current facility-administered medications on file prior to encounter.       Current Outpatient Medications on File Prior to Encounter   Medication Sig Dispense Refill    potassium chloride (KLOR-CON M) 20 MEQ extended release tablet Take 1 tablet by mouth daily 90 tablet 1    doxazosin (CARDURA) 2 MG tablet Take 1 tablet by mouth nightly 90 tablet 3    torsemide (DEMADEX) 20 MG tablet TAKE ONE TABLET BY MOUTH DAILY 90 tablet 0    calcium-cholecalciferol (CALCIUM 500+D) 500-200 MG-UNIT per tablet Take 1 tablet by mouth daily      Calcium Carb-Cholecalciferol (CALCIUM 500+D3) 500-400 MG-UNIT TABS Take 1 tablet by mouth 2 times daily      pioglitazone (ACTOS) 15 MG tablet Take 15 mg by mouth daily      cyclobenzaprine (FLEXERIL) 10 MG tablet Take 10 mg by mouth 3 times daily as needed for Muscle spasms      linagliptin (TRADJENTA) 5 MG tablet Take 5 mg by mouth daily      hydroxychloroquine (PLAQUENIL) 200 MG tablet Take  by mouth 2 times daily.  metoprolol (LOPRESSOR) 25 MG tablet TAKE ONE-HALF TABLET BY MOUTH TWICE A DAY 30 tablet 1    atorvastatin (LIPITOR) 80 MG tablet Take 80 mg by mouth daily.  levothyroxine (SYNTHROID) 100 MCG tablet Take 100 mcg by mouth daily.  insulin glargine (LANTUS) 100 UNIT/ML injection pen Inject 10 Units into the skin daily      cetirizine (ZYRTEC) 10 MG tablet Take 10 mg by mouth daily      aspirin 81 MG EC tablet Take 81 mg by mouth daily.             Allergies  Allergies   Allergen Reactions    Diclofenac Sodium Hives    Elavil [Amitriptyline Hcl] Hives    Sulfa Antibiotics Hives    Voltaren  [Diclofenac Sodium] Hives    Elavil  [Amitriptyline Hcl] Other (See Comments)      Social   Social History     Tobacco Use    Smoking status: Never Smoker    Smokeless tobacco: Never Used   Substance Use Topics    Alcohol use: No        Family History   Problem Relation Age of Onset    Heart Disease Father       Review of Systems  Constitutional: negative for fevers, chills, sweats    Ears, nose, mouth, throat, and face: negative for nasal congestion and sore throat   Respiratory: negative for cough and shortness of breath   Cardiovascular: negative for chest pain and dyspnea   Gastrointestinal: see hpi   Genitourinary:negative for dysuria and frequency   Integument/breast: negative for pruritus and rash   Hematologic/lymphatic: negative for bleeding and easy bruising   Musculoskeletal:negative for arthralgias and myalgias   Neurological: negative for dizziness and weakness         Physical Exam  Blood pressure 137/88, pulse 62, temperature 98.9 °F (37.2 °C), temperature source Oral, resp. rate 15, height 5' 2\" (1.575 m), weight 170 lb 6.4 oz (77.3 kg), SpO2 94 %, not currently breastfeeding. General appearance: alert, cooperative, no distress, appears stated age  Eyes: Anicteric  Head: Normocephalic, without obvious abnormality  Lungs: clear to auscultation bilaterally, Normal Effort  Heart: regular rate and rhythm, normal S1 and S2, no murmurs or rubs  Abdomen: soft, mild distention, non-tender. Bowel sounds very hypoactive. No masses,  no organomegaly. Extremities: atraumatic, no cyanosis or edema  Skin: warm and dry, no jaundice  Neuro: Grossly intact, A&OX3  Musculoskeletal: 5/5  strength BUE      Data Review:    Recent Labs     08/06/20 1842 08/07/20  0444   WBC 5.9 4.8   HGB 12.0 10.4*   HCT 35.3* 31.3*   MCV 90.6 90.3   * 102*     Recent Labs     08/06/20 1843 08/07/20  0444    144   K 3.7 3.3*    107   CO2 25 28   PHOS  --  3.2   BUN 19 16   CREATININE 1.5* 1.4*     Recent Labs     08/06/20 1843 08/07/20  0444   AST 40* 30   ALT 25 20   BILITOT 0.4 0.3   ALKPHOS 83 63     Recent Labs     08/06/20 1843 08/07/20  0444   LIPASE 16.0 11.0*     Recent Labs     08/06/20 1843 08/07/20  0444   PROTIME 11.4 12.2   INR 0.98 1.05     No results for input(s): PTT in the last 72 hours. No results for input(s): OCCULTBLD in the last 72 hours. Imaging Studies:                 CT-scan of chest abdomen and pelvis wo contrast     Impression    No acute intrathoracic abnormality.         Dilation of the stomach and small bowel, likely ileus.                          Assessment:     Active Problems:    Ileus (HCC)  Resolved Problems:    * No resolved hospital problems. *    Ileus - pt with abdominal distention s/p EGD and colonoscopy. No pneumatosis on CT but did show dilation of the stomach and small bowel.  Later AXR with air in small bowel and colon. Pt feels better today but still not passing flatus or BM. NG tube in place. Recommendations:   - Check AXR today  - If AXR improved, clamp NG and try sips of clears  - encouraged mobility, OOB as much as possibly    Discussed with Dr. Ingris Fuchs, [address]      I have personally performed a face to face diagnostic evaluation on this patient. I have interviewed and examined the patient and I agree with the findings and recommended plan of care. In summary, my findings and plan are the following:  Ileus after egd/colon with Dr. Sukh Trevizo yesterday. Pt with no bowel sounds. nontender minimal distention. Had 750ml out of NG. Still no flatus. Will repeat KUB. If improved will try some oral intake to see if will assist bowel function return.         Efrain Ruiz MD  [address]

## 2020-08-07 NOTE — PROGRESS NOTES
Initial assessment completed, VSS, afebrile, pt currently denies having pain but reports sore throat for which Dr. Amy Yost was made aware, throat spray ordered. NGT suctioning to LIWS with green/brown contents in cannister. Bowel sounds absent, abdomen tender to palpation, this RN heard pt passing flatus. POC discussed with pt, questions/concerns addressed at this time, fall px in place, bed alarm engaged, call light within reach, will continue to monitor.

## 2020-08-08 VITALS
BODY MASS INDEX: 31.89 KG/M2 | OXYGEN SATURATION: 96 % | RESPIRATION RATE: 18 BRPM | TEMPERATURE: 98.7 F | HEART RATE: 59 BPM | WEIGHT: 173.3 LBS | HEIGHT: 62 IN | SYSTOLIC BLOOD PRESSURE: 112 MMHG | DIASTOLIC BLOOD PRESSURE: 68 MMHG

## 2020-08-08 LAB
ANION GAP SERPL CALCULATED.3IONS-SCNC: 8 MMOL/L (ref 3–16)
BASOPHILS ABSOLUTE: 0 K/UL (ref 0–0.2)
BASOPHILS RELATIVE PERCENT: 0.5 %
BUN BLDV-MCNC: 17 MG/DL (ref 7–20)
CALCIUM SERPL-MCNC: 8.8 MG/DL (ref 8.3–10.6)
CHLORIDE BLD-SCNC: 112 MMOL/L (ref 99–110)
CO2: 25 MMOL/L (ref 21–32)
CREAT SERPL-MCNC: 1.3 MG/DL (ref 0.6–1.2)
EOSINOPHILS ABSOLUTE: 0 K/UL (ref 0–0.6)
EOSINOPHILS RELATIVE PERCENT: 0 %
GFR AFRICAN AMERICAN: 48
GFR NON-AFRICAN AMERICAN: 40
GLUCOSE BLD-MCNC: 104 MG/DL (ref 70–99)
GLUCOSE BLD-MCNC: 74 MG/DL (ref 70–99)
GLUCOSE BLD-MCNC: 76 MG/DL (ref 70–99)
GLUCOSE BLD-MCNC: 85 MG/DL (ref 70–99)
GLUCOSE BLD-MCNC: 93 MG/DL (ref 70–99)
HCT VFR BLD CALC: 27.8 % (ref 36–48)
HEMOGLOBIN: 9.4 G/DL (ref 12–16)
LYMPHOCYTES ABSOLUTE: 1 K/UL (ref 1–5.1)
LYMPHOCYTES RELATIVE PERCENT: 25.7 %
MAGNESIUM: 1.9 MG/DL (ref 1.8–2.4)
MCH RBC QN AUTO: 30.5 PG (ref 26–34)
MCHC RBC AUTO-ENTMCNC: 33.8 G/DL (ref 31–36)
MCV RBC AUTO: 90.3 FL (ref 80–100)
MONOCYTES ABSOLUTE: 0.5 K/UL (ref 0–1.3)
MONOCYTES RELATIVE PERCENT: 11.9 %
NEUTROPHILS ABSOLUTE: 2.5 K/UL (ref 1.7–7.7)
NEUTROPHILS RELATIVE PERCENT: 61.9 %
PDW BLD-RTO: 14.5 % (ref 12.4–15.4)
PERFORMED ON: ABNORMAL
PERFORMED ON: NORMAL
PLATELET # BLD: 91 K/UL (ref 135–450)
PLATELET SLIDE REVIEW: ABNORMAL
PMV BLD AUTO: 8.9 FL (ref 5–10.5)
POTASSIUM SERPL-SCNC: 3.5 MMOL/L (ref 3.5–5.1)
RBC # BLD: 3.08 M/UL (ref 4–5.2)
SODIUM BLD-SCNC: 145 MMOL/L (ref 136–145)
WBC # BLD: 4 K/UL (ref 4–11)

## 2020-08-08 PROCEDURE — 80048 BASIC METABOLIC PNL TOTAL CA: CPT

## 2020-08-08 PROCEDURE — 36415 COLL VENOUS BLD VENIPUNCTURE: CPT

## 2020-08-08 PROCEDURE — 6370000000 HC RX 637 (ALT 250 FOR IP): Performed by: INTERNAL MEDICINE

## 2020-08-08 PROCEDURE — 6360000002 HC RX W HCPCS: Performed by: INTERNAL MEDICINE

## 2020-08-08 PROCEDURE — 83735 ASSAY OF MAGNESIUM: CPT

## 2020-08-08 PROCEDURE — 2580000003 HC RX 258: Performed by: SURGERY

## 2020-08-08 PROCEDURE — 85025 COMPLETE CBC W/AUTO DIFF WBC: CPT

## 2020-08-08 RX ADMIN — ENOXAPARIN SODIUM 30 MG: 30 INJECTION SUBCUTANEOUS at 09:11

## 2020-08-08 RX ADMIN — CETIRIZINE HYDROCHLORIDE 10 MG: 10 TABLET, FILM COATED ORAL at 09:11

## 2020-08-08 RX ADMIN — CALCIUM CARBONATE-CHOLECALCIFEROL TAB 250 MG-125 UNIT 250 MG: 250-125 TAB at 09:11

## 2020-08-08 RX ADMIN — LEVOTHYROXINE SODIUM 100 MCG: 0.1 TABLET ORAL at 06:31

## 2020-08-08 RX ADMIN — HYDROXYCHLOROQUINE SULFATE 200 MG: 200 TABLET ORAL at 09:11

## 2020-08-08 RX ADMIN — ASPIRIN 81 MG: 81 TABLET, COATED ORAL at 09:11

## 2020-08-08 RX ADMIN — SODIUM CHLORIDE: 9 INJECTION, SOLUTION INTRAVENOUS at 05:01

## 2020-08-08 ASSESSMENT — PAIN SCALES - GENERAL: PAINLEVEL_OUTOF10: 0

## 2020-08-08 NOTE — PROGRESS NOTES
Pt up to bathroom, had a BM. Head to toe complete. VSS. Metoprolol not given due to HR 56. BS 85, scheduled insulin not given. Will continue to monitor.

## 2020-08-08 NOTE — PROGRESS NOTES
Pt given discharge instructions. All concerns addressed at this time. Pt told to follow up with PCP in 1 week.

## 2020-08-08 NOTE — PLAN OF CARE
Problem: Falls - Risk of:  Goal: Will remain free from falls  Description: Will remain free from falls  Outcome: Met This Shift  Goal: Absence of physical injury  Description: Absence of physical injury  Outcome: Met This Shift
Problem: Falls - Risk of:  Goal: Will remain free from falls  Description: Will remain free from falls  Outcome: Ongoing  Goal: Absence of physical injury  Description: Absence of physical injury  Outcome: Ongoing
questions or concerns. Thank you for allowing us to participate in [facility]. Plans discussed with GI, patient and nursing. Reviewed and discussed with Dr. Maira Pope consult to follow.       Signed:  PRADIP Chan - CNP  8/7/2020 8:32 AM

## 2020-08-08 NOTE — PROGRESS NOTES
[facility]    8/7/2020 10:15 PM        Name: Jordyn Vasquez . Admitted: 8/6/2020  Primary Care Provider: Sofia Mayfield MD (Tel: [phone])    Brief Course:  66 yo F with SATINDER, obesity, Afib, hypothyroidism, SATINDER, DM 2, RA, HTN, hyperlipidemia who came to ER after having EGD and colonoscopy with polypectomy on 8/6 with abdominal pain. Had ileus on CT C/A/P and NGT placed in ED. Admitted as inpatient for post operative ileus. Followed by GI and Surgery. Started on IVF. CC: Ab pain    Subjective:  . Patient denied flatus at time of my exam.  Still had NGT. Was going to AXR. No CP, SOB, HA or fevers.     Reviewed interval ancillary notes    Current Medications  0.9 % sodium chloride infusion, Continuous  morphine (PF) injection 2 mg, Q2H PRN    Or  morphine injection 4 mg, Q2H PRN  ondansetron (ZOFRAN) injection 4 mg, Q6H PRN  aspirin EC tablet 81 mg, Daily  atorvastatin (LIPITOR) tablet 80 mg, Nightly  oyster shell calcium/vitamin D 250-125 MG-UNIT per tablet 250 mg, BID  cetirizine (ZYRTEC) tablet 10 mg, Daily  cyclobenzaprine (FLEXERIL) tablet 10 mg, TID PRN  doxazosin (CARDURA) tablet 2 mg, Nightly  hydroxychloroquine (PLAQUENIL) tablet 200 mg, BID  insulin glargine (LANTUS;BASAGLAR) injection pen 10 Units, Nightly  metoprolol tartrate (LOPRESSOR) tablet 12.5 mg, BID  sodium chloride flush 0.9 % injection 10 mL, 2 times per day  sodium chloride flush 0.9 % injection 10 mL, PRN  acetaminophen (TYLENOL) tablet 650 mg, Q6H PRN    Or  acetaminophen (TYLENOL) suppository 650 mg, Q6H PRN  polyethylene glycol (GLYCOLAX) packet 17 g, Daily PRN  promethazine (PHENERGAN) tablet 12.5 mg, Q6H PRN  enoxaparin (LOVENOX) injection 30 mg, Daily  insulin lispro (1 Unit Dial) 4 Units, TID WC  glucose (GLUTOSE) 40 % oral gel 15 g, PRN  dextrose 50 % IV solution, PRN  glucagon (rDNA) injection 1 mg, PRN  dextrose 5 % solution, PRN  insulin lispro (1 Unit Dial) 0-6 Units, Q4H  levothyroxine (SYNTHROID) tablet 100 mcg, QAM AC        Objective:  /71   Pulse 71   Temp 98.4 °F (36.9 °C) (Oral)   Resp 18   Ht 5' 2\" (1.575 m)   Wt 170 lb 6.4 oz (77.3 kg)   SpO2 98%   BMI 31.17 kg/m²     Intake/Output Summary (Last 24 hours) at 8/7/2020 2215  Last data filed at 8/7/2020 2044  Gross per 24 hour   Intake 2279.7 ml   Output 825 ml   Net 1454.7 ml      Wt Readings from Last 3 Encounters:   08/07/20 170 lb 6.4 oz (77.3 kg)   06/01/20 173 lb 12.8 oz (78.8 kg)   02/24/20 174 lb 9.6 oz (79.2 kg)       General appearance:  Appears comfortable, obese  Eyes: Sclera clear. Pupils equal.  ENT: Moist oral mucosa. Trachea midline, no adenopathy. NGT  Cardiovascular: Regular rhythm, normal S1, S2. No murmur. No edema in lower extremities  Respiratory: Not using accessory muscles. Good inspiratory effort. Clear to auscultation bilaterally, no wheeze or crackles. GI: Abdomen soft, no tenderness, mildly distended, obese, normal bowel sounds  Musculoskeletal: No cyanosis in digits, neck supple  Neurology: CN 2-12 grossly intact. No speech or motor deficits  Psych: Normal affect. Alert and oriented in time, place and person  Skin: Warm, dry, normal turgor  Extremity exam shows brisk capillary refill. Peripheral pulses are palpable in lower extremities     Labs and Tests:  CBC:   Recent Labs     08/06/20 1842 08/07/20  0444   WBC 5.9 4.8   HGB 12.0 10.4*   * 102*     BMP:    Recent Labs     08/06/20 1843 08/07/20  0444    144   K 3.7 3.3*    107   CO2 25 28   BUN 19 16   CREATININE 1.5* 1.4*   GLUCOSE 144* 89     Hepatic:   Recent Labs     08/06/20 1843 08/07/20  0444   AST 40* 30   ALT 25 20   BILITOT 0.4 0.3   ALKPHOS 83 63     XR ABDOMEN (KUB) (SINGLE AP VIEW)   Final Result   Dilated gas-filled loop of bowel in the mid abdomen likely corresponds to   sigmoid and measures up to 10 cm.   Moderate amount of gas is seen throughout   the remainder of the colon consistent with recent colonoscopy. No obvious   free air or acute process on supine imaging. XR ABDOMEN FOR NG/OG/NE TUBE PLACEMENT   Final Result   Tip and side port of the enteric tube in the gastric body. CT CHEST ABDOMEN PELVIS WO CONTRAST   Final Result   No acute intrathoracic abnormality. Dilation of the stomach and small bowel, likely ileus. Problem List  Principal Problem:    Ileus ([facility] [address] [address]  Active Problems:    Hypertension    Hypothyroidism    Obstructive sleep apnea    Atrial fibrillation (HCC)    Esophageal reflux    Hyperlipidemia    Rheumatoid arthritis ([facility] [address]    Type 2 diabetes mellitus without complication, with long-term current use of insulin ([facility])    CKD (chronic kidney disease) stage 3, GFR 30-59 ml/min ([facility])  Resolved Problems:    * No resolved hospital problems. *       Assessment & Plan:   1. NGT per GI/Surgery  2. Diet per GI/Surgery  3. Cont IVF  4. Cont Plaquenil for RA  5. Cont Synthroid for hypothyroidism  6. Replete K, follow Mg  7. Cont ASA, Lipitor  8. Cont Lantus 10 U qhs and Humalog 4 U with meals  9. Patient would like to be monitored overnight on diet and not be discharged to home to have to come back if she is not better. IV Access: peripheral  M[place]artney: No  Diet: DIET CARB CONTROL;  Code:Full Code  DVT PPX Lovenox  Disposition Home    Discussed with patient, Ghada Arana (GI PA), nursing and CM. Hopefully back home in next 24-48 hrs.       Lissette Cisneros MD   8/7/2020 10:15 PM

## 2020-08-08 NOTE — PROGRESS NOTES
[facility] GI  Gastroenterology Progress Note    William Armstrong is a 67 y.o. female patient. 1. Ileus ([address]        SUBJECTIVE:    Doing well   She ate scrambled eggs this morning and also had a BM     ROS:  Cardiovascular ROS: no chest pain or dyspnea on exertion  Gastrointestinal ROS: see above  Respiratory ROS: no cough, shortness of breath, or wheezing    Physical    VITALS:  /67   Pulse 51   Temp 99.4 °F (37.4 °C) (Oral)   Resp 18   Ht 5' 2\" (1.575 m)   Wt 173 lb 4.8 oz (78.6 kg)   SpO2 98%   BMI 31.70 kg/m²   TEMPERATURE:  Current - Temp: 99.4 °F (37.4 °C); Max - Temp  Av.8 °F (37.1 °C)  Min: 98 °F (36.7 °C)  Max: 100 °F (37.8 °C)    NAD  RRR, Nl s1s2  Lungs CTA Bilaterally, normal effort  Abdomen soft, ND, NT, no HSM, Bowel sounds normal  AAOx3, No asterixis     Data      CBC:   Recent Labs     204 20  0451   WBC 5.9 4.8 4.0   RBC 3.90* 3.47* 3.08*   HGB 12.0 10.4* 9.4*   HCT 35.3* 31.3* 27.8*   * 102* 91*   MCV 90.6 90.3 90.3   MCH 30.8 30.1 30.5   MCHC 34.0 33.3 33.8   RDW 14.4 14.1 14.5        BMP:  Recent Labs     204 20  0451    144 145   K 3.7 3.3* 3.5    107 112*   CO2 25 28 25   BUN 19 16 17   CREATININE 1.5* 1.4* 1.3*   CALCIUM 10.0 9.3 8.8   GLUCOSE 144* 89 74        Hepatic Function Panel:   Recent Labs     20  0444   AST 40* 30   ALT 25 20   BILITOT 0.4 0.3   ALKPHOS 83 63       Recent Labs     20  0444   LIPASE 16.0 11.0*     Recent Labs     20  1843 20  0444   PROTIME 11.4 12.2   INR 0.98 1.05     No results for input(s): PTT in the last 72 hours. No results for input(s): OCCULTBLD in the last 72 hours. Radiology Review:    XR ABDOMEN (KUB) (SINGLE AP VIEW)   Final Result   Dilated gas-filled loop of bowel in the mid abdomen likely corresponds to   sigmoid and measures up to 10 cm.   Moderate amount of gas is seen throughout   the remainder of the colon consistent with recent colonoscopy. No obvious   free air or acute process on supine imaging. XR ABDOMEN FOR NG/OG/NE TUBE PLACEMENT   Final Result   Tip and side port of the enteric tube in the gastric body. CT CHEST ABDOMEN PELVIS WO CONTRAST   Final Result   No acute intrathoracic abnormality. Dilation of the stomach and small bowel, likely ileus.              Assessment:      Active Problems:    Ileus ([address]  Resolved Problems:    * No resolved hospital problems. *    Ileus - pt with abdominal distention s/p EGD and colonoscopy. No pneumatosis on CT but did show dilation of the stomach and small bowel. Later AXR with air in small bowel and colon. Clinically improved      Recommendations:   Discharge home today   GI will sign off.  Please call if you have questions    Radha Dior MD, MSc  [address] [facility]

## 2020-08-08 NOTE — DISCHARGE SUMMARY
[facility]    Patient Demographics    Patient. Silvia Dunlap  Date of Birth. 1940  MRN. [id number]     Primary care provider. Candis Vergara MD  (Tel: [phone])    Admit date: 8/6/2020    Discharge date 8/8/2020  Note Date: 8/8/2020     Reason for Hospitalization. Chief Complaint   Patient presents with    Abnormal Test Results     Had EGD and colonoscopy with polyps removed and now having increased shortness of breath. Significant Findings. Principal Problem:    Ileus ([address]  Active Problems:    Hypertension    Hypothyroidism    Obstructive sleep apnea    Atrial fibrillation (HCC)    Esophageal reflux    Hyperlipidemia    Rheumatoid arthritis ([address]    Type 2 diabetes mellitus without complication, with long-term current use of insulin (HCC)    CKD (chronic kidney disease) stage 3, GFR 30-59 ml/min ([facility])  Resolved Problems:    * No resolved hospital problems. *       Problems and results from this hospitalization that need follow up. Resume torsemide on Monday     Significant test results and incidental findings. Invasive procedures and treatments. [facility] Course. The patient had a routine C scope and EGD performed on 8/6/20 . (egd normal, colon tortous with diverticulosis and small polyp removed) Post operatively she reported increased shortness of breath and abdominal pain. She was evaluated in the ED then admitted . CT done in the ED did not show any pneumatosis but did show dilation of the stomach and small bowel. She was followed by general surgery and GI. She was treated for the following:    Ileus:  Pt was given supportive care and received NG decompression and was encouraged to ambulate. She was able to pass flatus, have BM and tolerate a soft diet at the time of d/c.       She was feeling well and was eager to be released home    HTN:  bp control was in range    Creat was 1.3 on day of d/c and she was instructed to hold her diuretic therapy for the next 48 hours. Consults. IP CONSULT TO HOSPITALIST  IP CONSULT TO GI  IP CONSULT TO HOSPITALIST    Physical examination on discharge day. /68   Pulse 59   Temp 98.7 °F (37.1 °C) (Oral)   Resp 18   Ht 5' 2\" (1.575 m)   Wt 173 lb 4.8 oz (78.6 kg)   SpO2 96%   BMI 31.70 kg/m²   General appearance. Alert. Looks comfortable. HEENT. Sclera clear. Moist mucus membranes. Cardiovascular. Regular rate and rhythm, normal S1, S2. No murmur. Respiratory. Not using accessory muscles. Clear to auscultation bilaterally, no wheeze. Gastrointestinal. Abdomen soft, non-tender, not distended, normal bowel sounds  Neurology. Facial symmetry. No speech deficits. Moving all extremities equally. Extremities. No edema in lower extremities. Skin. Warm, dry, normal turgor    Condition at time of discharge stable     Medication instructions provided to patient at discharge.      Medication List      CONTINUE taking these medications    aspirin 81 MG EC tablet     atorvastatin 80 MG tablet  Commonly known as:  LIPITOR     * Calcium 500+D 500-200 MG-UNIT per tablet  Generic drug:  calcium-cholecalciferol     * Calcium 500+D3 500-400 MG-UNIT Tabs  Generic drug:  Calcium Carb-Cholecalciferol     cetirizine 10 MG tablet  Commonly known as:  ZYRTEC     cyclobenzaprine 10 MG tablet  Commonly known as:  FLEXERIL     doxazosin 2 MG tablet  Commonly known as:  CARDURA  Take 1 tablet by mouth nightly     hydroxychloroquine 200 MG tablet  Commonly known as:  PLAQUENIL     insulin glargine 100 UNIT/ML injection pen  Commonly known as:  LANTUS;BASAGLAR     linagliptin 5 MG tablet  Commonly known as:  TRADJENTA     metoprolol tartrate 25 MG tablet  Commonly known as:  LOPRESSOR  TAKE ONE-HALF TABLET BY MOUTH TWICE A DAY     pioglitazone 15 MG tablet  Commonly known as:  ACTOS     potassium chloride 20 MEQ extended release tablet  Commonly known as:  KLOR-CON M  Take 1 tablet by mouth daily     Synthroid 100 MCG tablet  Generic drug:  levothyroxine     torsemide 20 MG tablet  Commonly known as:  DEMADEX  TAKE ONE TABLET BY MOUTH DAILY         * This list has 2 medication(s) that are the same as other medications prescribed for you. Read the directions carefully, and ask your doctor or other care provider to review them with you. Discharge recommendations given to patient. Follow Up. in 1 week   Disposition. Home   Activity. As tolerated   Diet: DIET CARB CONTROL;      Spent > 30  minutes in discharge process.     Signed:  PRADIP Mueller CNP     8/8/2020 12:11 PM

## 2020-08-25 ENCOUNTER — HOSPITAL ENCOUNTER (OUTPATIENT)
Dept: GENERAL RADIOLOGY | Age: 80
Discharge: HOME | End: 2020-08-25
Payer: MEDICARE

## 2020-08-25 PROCEDURE — 74250 X-RAY XM SM INT 1CNTRST STD: CPT

## 2020-10-21 ENCOUNTER — TELEPHONE (OUTPATIENT)
Dept: ENT CLINIC | Age: 80
End: 2020-10-21

## 2020-10-21 NOTE — TELEPHONE ENCOUNTER
Please call Kalyan Mirza on 10/22/2020 and schedule an in-office visit with me for ASAP. She was referred, to me, by Francisco Butler, [address] [place] [address] Street is to be scheduled with me, ASAP. Please let me know when she is scheduled.

## 2020-10-26 ENCOUNTER — OFFICE VISIT (OUTPATIENT)
Dept: ENT CLINIC | Age: 80
End: 2020-10-26
Payer: MEDICARE

## 2020-10-26 VITALS
BODY MASS INDEX: 31.47 KG/M2 | TEMPERATURE: 97.6 F | HEIGHT: 62 IN | DIASTOLIC BLOOD PRESSURE: 71 MMHG | HEART RATE: 52 BPM | WEIGHT: 171 LBS | SYSTOLIC BLOOD PRESSURE: 171 MMHG

## 2020-10-26 PROBLEM — H61.23 BILATERAL IMPACTED CERUMEN: Status: ACTIVE | Noted: 2020-10-26

## 2020-10-26 PROBLEM — H90.0 CONDUCTIVE HEARING LOSS OF BOTH EARS: Status: ACTIVE | Noted: 2020-10-26

## 2020-10-26 PROCEDURE — 69210 REMOVE IMPACTED EAR WAX UNI: CPT | Performed by: OTOLARYNGOLOGY

## 2020-10-26 NOTE — PATIENT INSTRUCTIONS
1. Please schedule an audiogram (hearing test), if your hearing is not back to your usual ability, or if hearing loss or tinnitus (ringing or other noise) persists, despite removal of ear wax,.  2. You may use an over the counter ear wax removal kit (such as Murine, Bausch and Lomb, NeilMed, or Debrox wax removal system) for ear wax removal, as needed. 3. It may help to use Debrox (OTC) for 4 days prior to future visits for ear cleaning. This may soften your ear wax and facilitate removal of the wax. NO Q-TIPS OR OTHER INSTRUMENTS/OBJECTS IN THE EARS   You should never clean your ears with a Q-tip, cotton tipped applicator, Cyndy pin, paper clip, safety pin, pen cap, or any other instrument. This will tend to push wax in deeper and pack the ear canal with wax. There is a high risk and danger of this practice, especially rupture of ear drum, dislocation or other damage to ossicles, and permanent, irreversible, and irreparable hearing loss. It may cause inflammation and irritation of the ear canal and cause itching or pain. I recommend only use of one the several ear wax removal kits available \"over the counter\" if you feel a need to try to remove ear wax. For example, Murine, Bausch and Lomb, NeilMed, or Debrox ear wax removal kits may be used for ear wax removal, as needed. No other methods should be self used for cleaning your ears.

## 2022-06-30 ENCOUNTER — OFFICE VISIT (OUTPATIENT)
Dept: RHEUMATOLOGY | Age: 82
End: 2022-06-30
Payer: MEDICARE

## 2022-06-30 VITALS
TEMPERATURE: 97.2 F | WEIGHT: 145 LBS | SYSTOLIC BLOOD PRESSURE: 102 MMHG | BODY MASS INDEX: 26.52 KG/M2 | DIASTOLIC BLOOD PRESSURE: 60 MMHG

## 2022-06-30 DIAGNOSIS — M32.9 SLE (SYSTEMIC LUPUS ERYTHEMATOSUS RELATED SYNDROME) (HCC): ICD-10-CM

## 2022-06-30 DIAGNOSIS — D64.9 ANEMIA, UNSPECIFIED TYPE: ICD-10-CM

## 2022-06-30 DIAGNOSIS — D61.818 PANCYTOPENIA (HCC): ICD-10-CM

## 2022-06-30 DIAGNOSIS — M15.9 PRIMARY OSTEOARTHRITIS INVOLVING MULTIPLE JOINTS: ICD-10-CM

## 2022-06-30 DIAGNOSIS — Z11.59 ENCOUNTER FOR SCREENING FOR OTHER VIRAL DISEASES: ICD-10-CM

## 2022-06-30 DIAGNOSIS — M32.9 SLE (SYSTEMIC LUPUS ERYTHEMATOSUS RELATED SYNDROME) (HCC): Primary | ICD-10-CM

## 2022-06-30 DIAGNOSIS — R50.9 FUO (FEVER OF UNKNOWN ORIGIN): ICD-10-CM

## 2022-06-30 LAB
A/G RATIO: 1.4 (ref 1.1–2.2)
ALBUMIN SERPL-MCNC: 3.4 G/DL (ref 3.4–5)
ALP BLD-CCNC: 74 U/L (ref 40–129)
ALT SERPL-CCNC: 12 U/L (ref 10–40)
ANION GAP SERPL CALCULATED.3IONS-SCNC: 13 MMOL/L (ref 3–16)
AST SERPL-CCNC: 21 U/L (ref 15–37)
BASOPHILS ABSOLUTE: 0 K/UL (ref 0–0.2)
BASOPHILS RELATIVE PERCENT: 0.6 %
BILIRUB SERPL-MCNC: 0.4 MG/DL (ref 0–1)
BUN BLDV-MCNC: 28 MG/DL (ref 7–20)
C-REACTIVE PROTEIN: 12.7 MG/L (ref 0–5.1)
C3 COMPLEMENT: 66.9 MG/DL (ref 90–180)
C4 COMPLEMENT: 7.2 MG/DL (ref 10–40)
CALCIUM SERPL-MCNC: 9.3 MG/DL (ref 8.3–10.6)
CHLORIDE BLD-SCNC: 95 MMOL/L (ref 99–110)
CO2: 23 MMOL/L (ref 21–32)
CREAT SERPL-MCNC: 1.5 MG/DL (ref 0.6–1.2)
CREATININE URINE: 112.8 MG/DL (ref 28–259)
DAT C3: NORMAL
DAT IGG: NORMAL
DAT POLYSPECIFIC: NORMAL
EOSINOPHILS ABSOLUTE: 0 K/UL (ref 0–0.6)
EOSINOPHILS RELATIVE PERCENT: 0.1 %
GFR AFRICAN AMERICAN: 40
GFR NON-AFRICAN AMERICAN: 33
GLUCOSE BLD-MCNC: 184 MG/DL (ref 70–99)
HAPTOGLOBIN: 102 MG/DL (ref 30–200)
HCT VFR BLD CALC: 27.1 % (ref 36–48)
HEMOGLOBIN: 9.4 G/DL (ref 12–16)
HEPATITIS B CORE IGM ANTIBODY: NORMAL
HEPATITIS B SURFACE ANTIGEN INTERPRETATION: NORMAL
HEPATITIS C ANTIBODY INTERPRETATION: NORMAL
IMMATURE RETIC FRACT: 0.5 (ref 0.21–0.37)
LACTATE DEHYDROGENASE: 340 U/L (ref 100–190)
LYMPHOCYTES ABSOLUTE: 0.2 K/UL (ref 1–5.1)
LYMPHOCYTES RELATIVE PERCENT: 11.1 %
MCH RBC QN AUTO: 29.6 PG (ref 26–34)
MCHC RBC AUTO-ENTMCNC: 34.8 G/DL (ref 31–36)
MCV RBC AUTO: 85 FL (ref 80–100)
MONOCYTES ABSOLUTE: 0.1 K/UL (ref 0–1.3)
MONOCYTES RELATIVE PERCENT: 3 %
NEUTROPHILS ABSOLUTE: 1.7 K/UL (ref 1.7–7.7)
NEUTROPHILS RELATIVE PERCENT: 85.2 %
PDW BLD-RTO: 15.4 % (ref 12.4–15.4)
PLATELET # BLD: 173 K/UL (ref 135–450)
PMV BLD AUTO: 7.7 FL (ref 5–10.5)
POTASSIUM SERPL-SCNC: 4.1 MMOL/L (ref 3.5–5.1)
PROTEIN PROTEIN: 16 MG/DL
PROTEIN/CREAT RATIO: 0.1 MG/DL
RBC # BLD: 3.19 M/UL (ref 4–5.2)
RETICULOCYTE ABSOLUTE COUNT: 0.05 M/UL (ref 0.02–0.1)
RETICULOCYTE COUNT PCT: 1.67 % (ref 0.5–2.18)
RHEUMATOID FACTOR: <10 IU/ML
SEDIMENTATION RATE, ERYTHROCYTE: 50 MM/HR (ref 0–30)
SODIUM BLD-SCNC: 131 MMOL/L (ref 136–145)
TOTAL PROTEIN: 5.8 G/DL (ref 6.4–8.2)
WBC # BLD: 2 K/UL (ref 4–11)

## 2022-06-30 PROCEDURE — 1090F PRES/ABSN URINE INCON ASSESS: CPT | Performed by: INTERNAL MEDICINE

## 2022-06-30 PROCEDURE — 99205 OFFICE O/P NEW HI 60 MIN: CPT | Performed by: INTERNAL MEDICINE

## 2022-06-30 PROCEDURE — G8417 CALC BMI ABV UP PARAM F/U: HCPCS | Performed by: INTERNAL MEDICINE

## 2022-06-30 PROCEDURE — 1123F ACP DISCUSS/DSCN MKR DOCD: CPT | Performed by: INTERNAL MEDICINE

## 2022-06-30 PROCEDURE — G8400 PT W/DXA NO RESULTS DOC: HCPCS | Performed by: INTERNAL MEDICINE

## 2022-06-30 PROCEDURE — G8427 DOCREV CUR MEDS BY ELIG CLIN: HCPCS | Performed by: INTERNAL MEDICINE

## 2022-06-30 PROCEDURE — 1036F TOBACCO NON-USER: CPT | Performed by: INTERNAL MEDICINE

## 2022-06-30 RX ORDER — LEVOTHYROXINE SODIUM 88 UG/1
TABLET ORAL
COMMUNITY
Start: 2022-05-31

## 2022-06-30 RX ORDER — APIXABAN 5 MG/1
TABLET, FILM COATED ORAL
COMMUNITY
Start: 2022-05-13

## 2022-06-30 RX ORDER — LIDOCAINE 4 G/G
1 PATCH TOPICAL DAILY
COMMUNITY
Start: 2022-05-19

## 2022-06-30 RX ORDER — METHYLPREDNISOLONE 4 MG/1
TABLET ORAL
COMMUNITY
Start: 2022-06-06

## 2022-06-30 RX ORDER — HYDRALAZINE HYDROCHLORIDE 100 MG/1
TABLET, FILM COATED ORAL
COMMUNITY
Start: 2022-04-12

## 2022-06-30 RX ORDER — DOXYCYCLINE HYCLATE 100 MG/1
CAPSULE ORAL
COMMUNITY
Start: 2022-06-06

## 2022-06-30 RX ORDER — SPIRONOLACTONE 25 MG/1
TABLET ORAL
COMMUNITY
Start: 2022-05-23

## 2022-06-30 RX ORDER — FOLIC ACID 1 MG/1
TABLET ORAL
COMMUNITY
Start: 2022-05-31

## 2022-06-30 NOTE — PROGRESS NOTES
2022  Patient Name: Elise Paredes  : [id number]  Medical Record: [id number]      ASSESSMENT AND PLAN    Assessment/Plan:      ASSESSMENT:    1. SLE (systemic lupus erythematosus related syndrome) (HCC)    2. Pancytopenia ([address]    3. FUO (fever of unknown origin)    4. Primary osteoarthritis involving multiple joints    5. Anemia, unspecified type    6. Encounter for screening for other viral diseases         PLAN:     Beth Israel was seen today for new patient. Diagnoses and all orders for this visit:    SLE (systemic lupus erythematosus related syndrome) (HCC)  -     C-Reactive Protein; Future  -     Sedimentation Rate; Future  -     Comprehensive Metabolic Panel; Future  -     Hepatitis B Core Antibody, IgM; Future  -     Hepatitis B Surface Antigen; Future  -     Hepatitis C Antibody; Future  -     Cyclic Citrul Peptide Antibody, IgG; Future  -     Rheumatoid Factor; Future  -     Beta-2 Glycoprotein Antibodies; Future  -     C3 Complement; Future  -     C4 Complement; Future  -     Cardiolipin Antibodies IgG & IgM; Future  -     Cardiolipin Antibody, IgA; Future  -     Protein / creatinine ratio, urine; Future  -     Lupus Anticoagulant; Future  -     Anti-DNA Antibody, Double-Stranded; Future    Pancytopenia (HCC)  -     CBC with Auto Differential; Future    FUO (fever of unknown origin)    Primary osteoarthritis involving multiple joints  -     XR HAND RIGHT (MIN 3 VIEWS); Future  -     XR HAND LEFT (MIN 3 VIEWS); Future    Anemia, unspecified type  -     Lactate Dehydrogenase; Future  -     Haptoglobin; Future  -     Reticulocytes; Future  -     DIRECT ANTIGLOBULIN TEST; Future  -     Quantiferon, Incubated; Future    Encounter for screening for other viral diseases   -     Hepatitis B Surface Antigen; Future    SLE-  Positive any 1: 1280 homogeneous pattern, positive anticardiolipin antibody, positive lupus anticoagulant and anticardiolipin IgG and IgM, low C3-C4.   Alopecia, dry eyes, dry mouth, nasal ulcers, intermittent rash, pancytopenia  dsDNA, anti-Smith, RNP, SSA/SSB, SCL 70 and anticentromere antibodies negative  Most likely lupus/systemic rheumatic disease  Previous history of Plaquenil use-she has been off of it for past 2 years  She has not seen a rheumatologist before. Pancytopenia, FUO, weight loss-she had extensive work-up for malignancy including bone marrow biopsy that was unremarkable. SPEP, immunofixation, serum free light chains have been normal  She had negative work-up for infection including multiple negative blood cultures and bone marrow biopsy  Pancytopenia and fever was felt to be most likely due to lupus flareup  Her blood counts are improving and she has not had any episode of fever over the past 2 weeks. Anemia-most likely multifactorial etiology due to CKD, diabetes, hypertension and lupus. She has anemia of chronic disease. She is receiving IV iron with Venofer, oral iron and vitamin B12. She follows oncologist/hematologist and nephrologist    Plan-I will repeat APL panel along with C3-C4, dsDNA and check urine for proteinuria. I will also repeat CBC. Since her counts have improved and she has not had any new episode of fever I will hold off on starting immunosuppressant drugs and steroids  I would recommend restarting Plaquenil if it is okay with ophthalmology due to her previous history of macular degeneration  If she is unable to take Plaquenil I will recommend low-dose CellCept  Further management based on test results    The patient indicates understanding of these issues and agrees with the plan. Return in about 3 weeks (around 7/21/2022). The risks and benefits of my recommendations, as well as other treatment options, benefits and side effects werediscussed with the patient. All questions were answered.     I reviewed patient's history, referral documents and electronic medical records  Copy of consult note is being routedelectronically/faxed to referring physician         MEDICATIONS  Current Outpatient Medications   Medication Sig Dispense Refill    doxycycline hyclate (VIBRAMYCIN) 100 MG capsule       ELIQUIS 5 MG TABS tablet       folic acid (FOLVITE) 1 MG tablet       hydrALAZINE (APRESOLINE) 100 MG tablet       spironolactone (ALDACTONE) 25 MG tablet       lidocaine 4 % external patch Place 1 patch onto the skin daily      levothyroxine (SYNTHROID) 88 MCG tablet       methylPREDNISolone (MEDROL DOSEPACK) 4 MG tablet       KLOR-CON M20 20 MEQ extended release tablet TAKE ONE TABLET BY MOUTH DAILY 90 tablet 1    losartan (COZAAR) 100 MG tablet       cetirizine (ZYRTEC) 10 MG tablet Take 10 mg by mouth daily      torsemide (DEMADEX) 20 MG tablet TAKE ONE TABLET BY MOUTH DAILY 90 tablet 0    insulin glargine (LANTUS) 100 UNIT/ML injection pen Inject 10 Units into the skin daily      Calcium Carb-Cholecalciferol (CALCIUM 500+D3) 500-400 MG-UNIT TABS Take 1 tablet by mouth 2 times daily      pioglitazone (ACTOS) 15 MG tablet Take 15 mg by mouth daily      cyclobenzaprine (FLEXERIL) 10 MG tablet Take 10 mg by mouth 3 times daily as needed for Muscle spasms      linagliptin (TRADJENTA) 5 MG tablet Take 5 mg by mouth daily      atorvastatin (LIPITOR) 80 MG tablet Take 80 mg by mouth daily.  aspirin 81 MG EC tablet Take 81 mg by mouth daily. No current facility-administered medications for this visit. ALLERGIES  Allergies   Allergen Reactions    Diclofenac Sodium Hives    Elavil [Amitriptyline Hcl] Hives    Sulfa Antibiotics Hives    Voltaren  [Diclofenac Sodium] Hives    Elavil  [Amitriptyline Hcl] Other (See Comments)         Comments  No specialty comments available.     REFERRING PHYSICIAN:Nelly Cárdenas CNP    HISTORY OF PRESENT ILLNESS  Rehana Calderón is a [de-identified] y.o. female past medical history of lupus, atrial fibrillation, osteoarthritis, congestive heart failure, diabetes mellitus, hypertension, chronic kidney disease, AOCD who is being seen for follow up evaluation of  lupus. She was diagnosed with lupus 13 years ago by dermatologist when she presented with hives. She was found to have positive WILLIAMS and placed on Plaquenil. She took Plaquenil for at least 10 years and stopped it 2 years ago. She continues to get occasional flareups of rash. She had a COVID booster vaccine in May and after the vaccine she developed fever on and off as high as 104. She was admitted to the hospital and found to have pancytopenia. She has also lost about 30 pounds over the past 2 to 3 months. She has loss of appetite and night sweats. She was evaluated by hematologist/oncologist in the hospital.  She had a bone marrow biopsy which was negative for malignancy or lymphoproliferative disorder. She also had CT chest abdomen and pelvis which was negative for malignancy. SPEP, serum free light chains and serum immunofixation was normal.    She also had multiple negative cultures and ID work-up    She was found to have positive WILLIAMS 1: 1280 homogeneous pattern, low C3-C4, positive chromatin antibody and positive lupus anticoagulant along with anticardiolipin antibody. She was felt to have lupus flareup and placed on prednisone which helped. Over the past 2 weeks she has not had any episode of fever and her WBC count has improved. Her last WBC count from June 12, 2022 was 2.7. 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 She has intermittent nasal ulcers, dry eyes and dry mouth, alopecia, rash. She denies malar rash, photosensitivity, oral ulcers, pleurisy or pericarditis, history of miscarriages, pregnancy complications, blood clots, sclerodactyly, skin thickening. She follows a nephrologist for chronic kidney disease. She also has anemia most likely due to chronic kidney disease. She is getting oral iron, IV Venofer.     HPI  Review of Systems    REVIEW OF SYSTEMS: Positive for fatigue, weight loss, fever, alopecia, rash, dry eyes and dry mouth, intermittent nasal ulcers, easy bruising, palpitations, shortness of breath  Integumentary: No photosensitivity, malar rash, livedo reticularis, and Raynaud's symptoms, sclerodactyly, skin tightening  Eyes: negative for visual disturbance and persistent redness, discharge from eyes   ENT: - No tinnitus, loss of hearing, vertigo, or recurrent ear infections.  - No history of oral ulcers. Cardiovascular: No history of pericarditis, chest pain or murmur   Respiratory: No cough or history of interstitial lung disease. No history of pleurisy. No history of tuberculosis or atypical infections. Gastrointestinal: No history of heart burn, dysphagia or esophageal dysmotility. No change in bowel habits or any inflammatory bowel disease. Genitourinary: No history of renal disease, miscarriages. Hematologic/Lymphatic: No bleeding, blood clots or swollen lymph nodes. Neurological: No history of headaches, seizure or focal weakness. No history of neuropathies, paresthesias or hyperesthesias, facial droop, diplopia  Psychiatric: No history of bipolar disease, anxiety, depression  Endocrine: Denies any polyuria, polydipsia and osteoporosis  Allergic/Immunologic: No nasal congestion or hives. I have reviewed patients Past medical History, Social History and Family History as mentioned in her chart and this remains unchanged fromprevious.     Past Medical History:   Diagnosis Date    Chronic back pain     Hyperlipidemia     Hypertension     Hypothyroidism     Sleep apnea     W/CPAP    Type II or unspecified type diabetes mellitus without mention of complication, not stated as uncontrolled     Unspecified sleep apnea      Past Surgical History:   Procedure Laterality Date    BACK SURGERY      LUMBAR EDUARDO AND CERVICAL SURGERY    BREAST SURGERY      REDUCTION ,BREAST BIOPSYX3    HYSTERECTOMY (CERVIX STATUS UNKNOWN)      JOINT REPLACEMENT      DAVID KNEE     Social History Socioeconomic History    Marital status:      Spouse name: Not on file    Number of children: Not on file    Years of education: Not on file    Highest education level: Not on file   Occupational History    Not on file   Tobacco Use    Smoking status: Never Smoker    Smokeless tobacco: Never Used   Substance and Sexual Activity    Alcohol use: No    Drug use: No    Sexual activity: Not on file   Other Topics Concern    Not on file   Social History Narrative    Not on file     Social Determinants of Health     Financial Resource Strain:     Difficulty of Paying Living Expenses: Not on file   Food Insecurity:     Worried About Running Out of Food in the Last Year: Not on file    Mark of Food in the Last Year: Not on file   Transportation Needs:     Lack of Transportation (Medical): Not on file    Lack of Transportation (Non-Medical):  Not on file   Physical Activity:     Days of Exercise per Week: Not on file    Minutes of Exercise per Session: Not on file   Stress:     Feeling of Stress : Not on file   Social Connections:     Frequency of Communication with Friends and Family: Not on file    Frequency of Social Gatherings with Friends and Family: Not on file    Attends Sabianist Services: Not on file    Active Member of [address] [place] [address] [employer] or Organizations: Not on file    Attends Club or Organization Meetings: Not on file    Marital Status: Not on file   Intimate Partner Violence:     Fear of Current or Ex-Partner: Not on file    Emotionally Abused: Not on file    Physically Abused: Not on file    Sexually Abused: Not on file   Housing Stability:     Unable to Pay for Housing in the Last Year: Not on file    Number of [place] in the Last Year: Not on file    Unstable Housing in the Last Year: Not on file     Family History   Problem Relation Age of Onset    Heart Disease Father          PHYSICAL EXAM   Vitals:    06/30/22 1149   BP: 102/60   Site: Left Upper Arm   Position: Integument:  Well hydrated, no rash or telangiectasias  Lymphatic:  No lymphadenopathy noted   Neurologic:   Alert & oriented x 3, CN 2-12 normal, no focal deficits noted. Sensations Intact. Muscle strength 5/5 proximally and distally in upper and lower extremities.    Psychiatric:  Speech and behavior appropriate   Extremities: 2+ bilateral pedal edema up to the knees        LABS AND IMAGING  Outside data reviewed and in HPI    Lab Results   Component Value Date/Time    WBC 2.7 06/22/2022 11:17 AM    RBC 3.23 06/22/2022 11:17 AM    HGB 9.4 06/22/2022 11:17 AM    HCT 28.9 06/22/2022 11:17 AM     06/22/2022 11:17 AM    MCV 90 06/22/2022 11:17 AM    MCH 29.1 06/22/2022 11:17 AM    MCHC 32.5 06/22/2022 11:17 AM    RDW 14.1 06/22/2022 11:17 AM    SEGSPCT 84 06/22/2022 11:17 AM    LYMPHOPCT 11 06/22/2022 11:17 AM    MONOPCT 5 06/22/2022 11:17 AM    EOSPCT 0.0 03/28/2011 08:04 AM    BASOPCT 0 06/22/2022 11:17 AM    MONOSABS 0.1 06/22/2022 11:17 AM    LYMPHSABS 0.3 06/22/2022 11:17 AM    EOSABS 0.0 06/22/2022 11:17 AM    BASOSABS 0.0 06/22/2022 11:17 AM    DIFFTYPE Auto-K 03/28/2011 08:04 AM       Chemistry        Component Value Date/Time     (L) 06/22/2022 1117    K 3.5 06/22/2022 1117    CL 98 06/22/2022 1117    CO2 21 06/22/2022 1117    BUN 21 06/22/2022 1117    CREATININE 1.22 (H) 06/22/2022 1117        Component Value Date/Time    CALCIUM 8.8 06/22/2022 1117    ALKPHOS 63 08/07/2020 0444    AST 30 08/07/2020 0444    ALT 20 08/07/2020 0444    BILITOT 0.3 08/07/2020 0444          No results found for: SEDRATE  No results found for: CRP  No results found for: WILLIAMS, JONI, SSA, SSB, C3, C4  No results found for: RF, CCPABIGG  No results found for: WILLIAMS, ANATITER, ANAINT, PATH  No results found for: DSDNAG, DSDNAIGGIFA  No results found for: SSAROAB, SSALAAB  No results found for: SMAB, RNPAB  No results found for: CENTABIGG  No results found for: C3, C4, ACE  Lab Results   Component Value Date/Time    DUZT30 33.7 06/22/2022 11:17 AM     No results found for: Samia Jimenez  No results found for: Shahida Clinton  Lab Results   Component Value Date    TSH 0.92 06/13/2012     Lab Results   Component Value Date    VITD25 33.7 06/22/2022     Care everywhere was reviewed including hospital records, labs, radiology report as mentioned in HPI  5/20/2022  Positive WILLIAMS 1: 1280 homogeneous pattern, positive chromatin antibody [3.4]  dsDNA, anti-Smith, RNP, SSA/SSB, SCL 70 and anticentromere is negative  Lupus anticoagulant positive  Anticardiolipin IgG 43, IgM 88  Beta-2 glycoprotein negative  C3 59  C4 5    Bone marrow biopsy May 2022  The bone marrow biopsy shows adequate cellularity for patient's age. The bone marrow aspirate shows maturing trilineage hematopoiesis with no significant morphologic evidence of dysplasia or increased blasts. Occasional macrophages with cytoplasmic basophilic debris are seen. Ocassional small discrete lymphoid aggregates are seen on the core biopsy. These are composed of small mature lymphocytes with a mixture of CD3+ T-cells and CD20+ B-cells, consistent with reactive lymphoid aggregates. Overall, there is no definitive evidence of involvement by a hematolymphoid neoplasm or other malignancy. The biopsy findings are not specific as to the etiology of the patient's cytopenias. Correlation with patient's drug/medication history, and assessment for toxin exposure, concurrent infection and autoimmune disorders is recommended. Time Spent With Patient:  Time spent with patient: 60 minutes  Time spent discussing diagnosis, management, reviewing the medical records and treatment plan: > 30 minutes      ######################################################################    I thank you for giving me theopportunity to participate in [facility]. If you have any questions or concerns please feel free to contact me. I look forward to following  Beth Manual along with you.       Electronically signed by: Jose De Jesus Herman MD, MD, 6/30/2022 12:49 PM    Documentation was done using voice recognition dragon software. Every effort was made to ensure accuracy;however, inadvertent unintentional computerized transcription errors may be present.

## 2022-06-30 NOTE — LETTER
[address] [facility] [address] [id number]  Phone: [phone]  Fax: [phone]    Kacy Martin MD    June 30, 2022     [facility] [address]  [place] [facility] [address]    Patient: Fariha Dan   MR Number: [id number]   YOB: 1940   Date of Visit: 6/30/2022       Dear Pamela Corcoran:    Thank you for referring Marikay Meigs to me for evaluation/treatment. Below are the relevant portions of my assessment and plan of care. If you have questions, please do not hesitate to call me. I look forward to following Nelsy Clarke along with you.     Sincerely,      Kacy Martin MD

## 2022-07-01 LAB
ANTI-DSDNA IGG: 2 IU/ML (ref 0–9)
CYCLIC CITRULLINATED PEPTIDE ANTIBODY IGG: <0.5 U/ML (ref 0–2.9)

## 2022-07-02 LAB
ANTICARDIOLIPIN IGA ANTIBODY: <10 APL
ANTICARDIOLIPIN IGG ANTIBODY: 14 GPL
CARDIOLIPIN AB IGM: 142 MPL

## 2022-07-03 LAB
BETA-2 GLYCOPROTEIN 1 IGG ANTIBODY: <10 SGU
BETA-2 GLYCOPROTEIN 1 IGM ANTIBODY: 16 SMU
QUANTI TB GOLD PLUS: NEGATIVE
QUANTI TB1 MINUS NIL: 0.01 IU/ML (ref 0–0.34)
QUANTI TB2 MINUS NIL: 0.01 IU/ML (ref 0–0.34)
QUANTIFERON MITOGEN: 0.65 IU/ML
QUANTIFERON NIL: 0.07 IU/ML

## 2022-07-06 LAB
DRVVT CONFIRMATION TEST: NEGATIVE RATIO
DRVVT SCREEN: 73 SEC (ref 33–44)
DRVVT,DIL: 59 SEC (ref 33–44)
HEXAGONAL PHOSPHOLIPID NEUTRALIZAT TEST: NEGATIVE
LUPUS ANTICOAG INTERP: ABNORMAL
PLT NEUTA: ABNORMAL
PT D: 19.9 SEC (ref 12–15.5)
PTT D: 45 SEC (ref 32–48)
PTT-D CORR REFLEX: ABNORMAL SEC (ref 32–48)
PTT-HEPARIN NEUTRALIZED: ABNORMAL SEC (ref 32–48)
REPTILASE TIME: ABNORMAL SEC
THROMBIN TIME: ABNORMAL SEC (ref 14.7–19.5)

## 2022-07-07 LAB — BLOOD BANK REF CASE: NORMAL
